# Patient Record
Sex: MALE | Race: BLACK OR AFRICAN AMERICAN
[De-identification: names, ages, dates, MRNs, and addresses within clinical notes are randomized per-mention and may not be internally consistent; named-entity substitution may affect disease eponyms.]

---

## 2018-03-27 ENCOUNTER — HOSPITAL ENCOUNTER (EMERGENCY)
Dept: HOSPITAL 68 - ERH | Age: 68
End: 2018-03-27
Payer: COMMERCIAL

## 2018-03-27 VITALS — SYSTOLIC BLOOD PRESSURE: 144 MMHG | DIASTOLIC BLOOD PRESSURE: 78 MMHG

## 2018-03-27 DIAGNOSIS — Z79.4: ICD-10-CM

## 2018-03-27 DIAGNOSIS — J18.9: Primary | ICD-10-CM

## 2018-03-27 DIAGNOSIS — I10: ICD-10-CM

## 2018-03-27 DIAGNOSIS — E11.9: ICD-10-CM

## 2018-03-27 LAB
ABSOLUTE GRANULOCYTE CT: 4.4 /CUMM (ref 1.4–6.5)
APTT BLD: 32 SEC (ref 25–37)
BASOPHILS # BLD: 0.1 /CUMM (ref 0–0.2)
BASOPHILS NFR BLD: 0.7 % (ref 0–2)
EOSINOPHIL # BLD: 0.3 /CUMM (ref 0–0.7)
EOSINOPHIL NFR BLD: 4 % (ref 0–5)
ERYTHROCYTE [DISTWIDTH] IN BLOOD BY AUTOMATED COUNT: 15.5 % (ref 11.5–14.5)
GRANULOCYTES NFR BLD: 55 % (ref 42.2–75.2)
HCT VFR BLD CALC: 37.7 % (ref 42–52)
LYMPHOCYTES # BLD: 2.6 /CUMM (ref 1.2–3.4)
MCH RBC QN AUTO: 26.6 PG (ref 27–31)
MCHC RBC AUTO-ENTMCNC: 31.5 G/DL (ref 33–37)
MCV RBC AUTO: 84.3 FL (ref 80–94)
MONOCYTES # BLD: 0.6 /CUMM (ref 0.1–0.6)
PLATELET # BLD: 236 /CUMM (ref 130–400)
PMV BLD AUTO: 9.5 FL (ref 7.4–10.4)
PROTHROMBIN TIME: 12.8 SEC (ref 9.4–12.5)
RED BLOOD CELL CT: 4.47 /CUMM (ref 4.7–6.1)
WBC # BLD AUTO: 7.9 /CUMM (ref 4.8–10.8)

## 2018-03-27 NOTE — ED GENERAL ADULT
History of Present Illness
 
General
Chief Complaint: Upper Respiratory Sx/Fever
Stated Complaint: CHEST CONGESTION, SOB
Source: patient
Exam Limitations: no limitations
Allergies
Coded Allergies:
acetaminophen (PER PT MD SAID NO APAP HX OF HEP C 18)
 
Reconcile Medications
Amlodipine Besylate 10 MG TABLET   1 TAB PO DAILY BP  (Reported)
Amoxicillin/Potassium Clav (Augmentin 875-125 Tablet) 875 MG-125 MG TABLET   1 
TAB PO BID PNEUMONIA
Benzonatate (Unknown Strength) CAPSULE   (Unknown Dose) PO AD COUGH  (Reported)
Budesonide/Formoterol Fumarate (Symbicort 160-4.5 Mcg Inhaler) 160 MCG-4.5 MCG/
ACTUATION HFA.AER.AD   2 PUF INH BID BREATHING PROBLEMS  (Reported)
Carvedilol 12.5 MG TABLET   1 TAB PO BID HEART/BP  (Reported)
Codeine Phosphate/Guaifenesi (Cheratussin AC Syrup) 10 MG-100 MG/5 ML LIQUID   5
-10 ML PO Q6 PRN COUGH
Codeine Phosphate/Guaifenesi (Cheratussin AC Syrup) 10 MG-100 MG/5 ML LIQUID   5
-10 ML PO Q6HR PRN COUGH
Diclofenac Sodium (Voltaren) (Unknown Strength) GEL..GRAM.   (Unknown Dose) TOP 
AD PRN PAIN  (Reported)
     apply to affected area(s)
Duloxetine HCl 60 MG CAPSULE.DR   1 CAP PO DAILY MENTAL HEALTH  (Reported)
Famotidine 40 MG TABLET   1 TAB PO DAILY GI  (Reported)
Gabapentin 100 MG CAPSULE   1 CAP PO QAM MENTAL HEALTH  (Reported)
Insulin Glargine,Hum.rec.anlog (Lantus Solostar) 100 UNIT/ML (3 ML) INSULN.PEN  
25 UNIT SC QHS DM  (Reported)
Losartan Potassium 100 MG TABLET   1 TAB PO DAILY BP  (Reported)
Naproxen 500 MG TABLET   1 TAB PO AD PRN PAIN  (Reported)
Pantoprazole Sodium 40 MG TABLET.DR   1 TAB PO DAILY GI  (Reported)
Prednisone 10 MG TABLET   3 TAB PO DAILY cough
Tramadol HCl 50 MG TABLET   1 TAB PO AD PRN PAIN  (Reported)
 
Triage Nurses Notes Reviewed? yes
Onset: Gradual
Duration: worse persistent since (1 WEEK)
Timing: recent history
Injury Environment: home
Severity: moderate
No Modifying Factors: none
Associated Symptoms: cough
HPI:
Patient is a 67-year-old male with history of diabetes and hypertension 
presenting to the emergency department with chief complaint of nonproductive 
cough as been going on for the past 4-5 weeks.  Patient was diagnosed with the 
flu about a month and half ago and symptoms persisted since then.  Denies any 
fevers but reports chills.  No nausea or vomiting.  Denies any chest pain or 
palpitations.  He's been using his inhaler intermittently for symptoms without 
relief.  He went back to the hospital 3 days ago and they told him "it was just 
a cough" and that he should use over-the-counter cough medication to help with 
his symptoms.  No relief with over-the-counter medications.  Patient reports 
symptoms are continuous.  Denies any lower extremities swelling.  Denies recent 
travel.  No sick contacts.
(Josephine Mensah)
 
Vital Signs & Intake/Output
Vital Signs & Intake/Output
 Vital Signs
 
 
Date Time Temp Pulse Resp B/P B/P Pulse O2 O2 Flow FiO2
 
     Mean Ox Delivery Rate 
 
 1759 97.5 62 18 144/78  97 Room Air Room Air 
 
 1640 97.8 58 20 189/98     
 
 1640 97.8 58 20 189/98     
 
 1457 97.8 58 20 189/98  98 Room Air Room Air 
 
 1310      99   
 
 
 
(Kamilla SAEZ,Edmond FAGAN)
 
Past History
 
Travel History
Traveled to [place] past 21 day No
 
Medical History
Any Pertinent Medical History? see below for history
Neurological: NONE
EENT: NONE
Cardiovascular: hypertension
Gastrointestinal: NONE
Hepatic: NONE
Renal: NONE
Musculoskeletal: NONE
Psychiatric: NONE
Endocrine: DM
Blood Disorders: NONE
 
Surgical History
Surgical History: non-contributory
 
Psychosocial History
What is your primary language English
 
Family History
Hx Contributory? No
(Josephine Mensah)
 
Review of Systems
 
Review of Systems
Constitutional:
Reports: see HPI, chills. 
Comments
Review of systems: See HPI, All other systems negative.
Constitutional, no chills fever or weight loss
HEENT: No visual changes no sore throat 
Cardiovascular: No chest pain ,palpitation , orthopnea or ankle swelling
Skin, no jaundice no rashes
Respiratory: No sputum or hemoptysis
GI: No nausea no vomiting
: No dysuria No hematuria
Muscle skeletal: no back pain, no neck pain,
Neurologic: No numbness no confusion NO HEADACHES
Psych: No stress anxiety or depression,.
Heme/endocrine: No bruising no bleeding no polyuria or polydipsia
Immunology: No splenectomy or history of AIDS
(Theron JONESJosephine)
 
Physical Exam
 
Physical Exam
General Appearance: well developed/nourished, alert, awake, mild distress
Comments:
Well-developed well-nourished person in no acute distress
HEENT:  Pupils equally round and reactive to light and accommodation. Nose is 
atraumatic. External auditory canal and Tympanic membranes clear. Pharynx 
normal. No swelling or edema. 
Neck: Supple, no lymphadenopathy
Back: Nontender
Cardiovascular: Regular rate and rhythms no murmurs rubs or gallops
Respiratory: Chest nontender. MOD respiratory distress.WHEEZING  to auscultation
bilaterally with scattered rhonchi.
Abdomen: Soft, nontender nondistended, no appreciable organomegaly. Normal bowel
sounds. No ascites
Extremity: No edema, no calf tenderness to palpation, normal and equal pulses.
Neuro: Alert oriented x3
Skin: No appreciable rash on exposed skin, skin is warm and dry.
Psych: Mood and affect is normal, memory and judgment is normal.
 
Core Measures
ACS in differential dx? No
CVA/TIA Diagnosis: No
Sepsis Present: No
Sepsis Focused Exam Completed? No
(Theron JONES,Josephine)
 
Progress
Differential Diagnoses
I considered the following diagnoses in my evaluation of the patient:  
Bronchitis, COPD exacerbation, pneumonia, influenza, ACS, pulmonary embolism
 
Diagnostic Imaging:
Viewed by Me: Radiology Read.  Discussed w/RAD: Radiology Read. 
Radiology Impression: ATIENT: KRISTEN NUNN  MEDICAL RECORD NO: [id number] PRESENT AGE
: 67  PATIENT ACCOUNT NO: [id number] : 51  LOCATION: [facility] ORDERING 
PHYSICIAN: Josephine JONES     SERVICE DATE: 124 EXAM TYPE: RAD - 
XRY-CHEST XRAY, TWO VIEWS EXAMINATION: XR CHEST CLINICAL INFORMATION: Cough for 
5 weeks. COMPARISON: Chest radiograph 2018. TECHNIQUE: 2 views of the 
chest were obtained. FINDINGS: There is patchy airspace opacity in the right mid
lung zone which is new since 2018. There is bibasilar subsegmental 
atelectasis. There is no pleural effusion or pneumothorax. The heart is top 
normal in size. There are mild degenerative changes in the thoracic spine. 
IMPRESSION: Developing airspace opacity in the right midlung zone compatible 
with pneumonia and new since 2018. DICTATED BY: Abi Johnston MD  DATE/
TIME DICTATED:18 :[facility]  DATE/TIME TRANSCRIBED:
18 CONFIDENTIAL, DO NOT COPY WITHOUT APPROPRIATE AUTHORIZATION.  <
Electronically signed in Other Vendor System>                                   
                                                    SIGNED BY: Abi Johnston MD
18 1350
Initial ED EKG: sinus rhythm at 64 bpm, ( T-wave inversion in the later)
Prior EKG: changed
Repeat EKG: unchanged
Comments:
3/27/2018 7:00:53 PM  Patient has not had chest pain entire emergency department
visit.  Patient feeling much improved after breathing treatment and cough 
medication.  Wheezing has improved on exam.  Patient does have pneumonia 
identified on chest x-ray.  Repeat EKG and troponin aren't unchanged from first 
set.  Symptoms have been constant for 5 weeks.  Patient will follow-up with 
cardiology outpatient.  Discussed with Dr. Dewitt, he agrees with plan.
(Theron JONES,Josephine)
Plan of Care:
 Orders
 
 
Procedure Date/time Status
 
TROPONIN LEVEL  1547 Complete
 
EKG  1547 Active
 
RAPID VIRAL INFLUENZA A  1241 Complete
 
Telemetry/Cardiac Monitor  1240 Active
 
TROPONIN LEVEL  1240 Complete
 
PARTIAL THROMBOPLASTIN TIME  1240 Complete
 
PROTHROMBIN TIME  1240 Complete
 
COMPREHENSIVE METABOLIC PANEL  1240 Complete
 
CBC WITHOUT DIFFERENTIAL  1240 Complete
 
EKG  1203 Active
 
 
 Current Medications
 
 
  Sig/George Start time  Last
 
Medication Dose  Stop Time Status Admin
 
Methylprednisolone 125 MG ONCE ONE  1245 CAN 
 
(Solu Medrol)    1246  
 
 
 Laboratory Tests
 
 
 
18 1744:
Troponin I 0.03
 
18 1335:
Anion Gap 14, Estimated GFR 51  L, BUN/Creatinine Ratio 11.4, Glucose 80, 
Calcium 9.2, Total Bilirubin 1.1, AST 19, ALT 11  L, Alkaline Phosphatase 55, 
Troponin I 0.03, Total Protein 8.0, Albumin 4.2, Globulin 3.8, Albumin/Globulin 
Ratio 1.1, PT 12.8  H, INR 1.17, APTT 32, CBC w Diff NO MAN DIFF REQ, RBC 4.47  
L, MCV 84.3, MCH 26.6  L, MCHC 31.5  L, RDW 15.5  H, MPV 9.5, Gran % 55.0, 
Lymphocytes % 32.5, Monocytes % 7.8, Eosinophils % 4.0, Basophils % 0.7, 
Absolute Granulocytes 4.4, Absolute Lymphocytes 2.6, Absolute Monocytes 0.6, 
Absolute Eosinophils 0.3, Absolute Basophils 0.1
 Microbiology
1641  NASOPHARYN: Influenza Virus A & B Rapid Smear - COMP
 135  BLOOD: Blood Culture - CAN
                Cancelled: Cancelled via OE: Error
1351  BLOOD: Blood Culture - CAN
                Cancelled: Cancelled via OE: Error
 
 
 
 
(Josephine Mensah)
(Kamilla SAEZ,Edmond FAGAN)
 
Departure
 
Departure
Time of Disposition: 
Disposition: HOME OR SELF CARE
Condition: Stable
Clinical Impression
Primary Impression: Pneumonia
Qualifiers:  Pneumonia type: due to unspecified organism Laterality: right Lung 
location: middle lobe of lung Qualified Code: J18.1 - Lobar pneumonia, 
unspecified organism
Referrals:
BENITO Roldan MD
 
Unknown (PCP/Family)
 
Additional Instructions:
Follow-up with your primary care physician in the next 1-2 days.  Take 
antibiotics, prednisone taper and cough medication as prescribed.  Increase 
fluids.  Return if worsening symptoms or concerns.
 
Also follow-up with cardiology as U EKG was changed from previous EKG in 
February.  Return to the emergency department if he develop any chest pain 
shortness of breath worsening symptoms or concerns.
 
 
Departure Forms:
Customer Survey
General Discharge Information
Prescriptions:
Current Visit Scripts
Amoxicillin/Potassium Clav (Augmentin 875-125 Tablet) 1 TAB PO BID  
     #20 TAB 
 
Codeine Phosphate/Guaifenesi (Cheratussin AC Syrup) 5-10 ML PO Q6HR PRN COUGH 
     #125 ML 
 
Prednisone 3 TAB PO DAILY  
     #12 TAB 
 
 
(Josephine Mensah)
 
PA/NP Co-Sign Statement
Statement:
ED Attending supervision documentation-
 
[x] I saw and evaluated the patient. I have also reviewed all the pertinent lab 
results and diagnostic results. I agree with the findings and the plan of care 
as documented in the PA's/NP's documentation.  Patient presents for evaluation 
of chest congestion and wheezing and shortness of breath.  Physical examination 
reveals adequate bilateral air entry with scattered end expiratory wheezing and 
mild rhonchi.  Lower extremities reveals no edema or tenderness.
 
[] I have reviewed the ED Record and agree with the PA's/NP's documentation.
 
[] Additions or exceptions (if any) to the PAs/NP's note and plan are 
summarized below:
[]
 
(Kamilla SAEZ,Edmond FAGAN)
 
PA/NP Co-Sign Statement
Statement:
ED Attending supervision documentation-
 
[] I saw and evaluated the patient. I have also reviewed all the pertinent lab 
results and diagnostic results. I agree with the findings and the plan of care 
as documented in the PA's/NP's documentation. 
 
[] I have reviewed the ED Record and agree with the PA's/NP's documentation.
 
[] Additions or exceptions (if any) to the PAs/NP's note and plan are 
summarized below:
[]
 
(Edmond Dewitt DO)
 
Critical Care Note
 
Critical Care Note
Critical Care Time: non-applicable
(Josephine Mensah)

## 2018-04-23 ENCOUNTER — HOSPITAL ENCOUNTER (INPATIENT)
Dept: HOSPITAL 68 - ERH | Age: 68
LOS: 4 days | Discharge: ANOTHER HOSPITAL | DRG: 291 | End: 2018-04-27
Admitting: INTERNAL MEDICINE
Payer: COMMERCIAL

## 2018-04-23 VITALS — BODY MASS INDEX: 26.51 KG/M2 | HEIGHT: 69 IN | WEIGHT: 179 LBS

## 2018-04-23 VITALS — SYSTOLIC BLOOD PRESSURE: 178 MMHG | DIASTOLIC BLOOD PRESSURE: 104 MMHG

## 2018-04-23 VITALS — SYSTOLIC BLOOD PRESSURE: 162 MMHG | DIASTOLIC BLOOD PRESSURE: 78 MMHG

## 2018-04-23 DIAGNOSIS — R00.1: ICD-10-CM

## 2018-04-23 DIAGNOSIS — I50.23: ICD-10-CM

## 2018-04-23 DIAGNOSIS — J44.9: ICD-10-CM

## 2018-04-23 DIAGNOSIS — Z88.8: ICD-10-CM

## 2018-04-23 DIAGNOSIS — I13.0: Primary | ICD-10-CM

## 2018-04-23 DIAGNOSIS — R06.89: ICD-10-CM

## 2018-04-23 DIAGNOSIS — I77.819: ICD-10-CM

## 2018-04-23 DIAGNOSIS — R09.02: ICD-10-CM

## 2018-04-23 DIAGNOSIS — D72.829: ICD-10-CM

## 2018-04-23 DIAGNOSIS — N18.2: ICD-10-CM

## 2018-04-23 DIAGNOSIS — I16.0: ICD-10-CM

## 2018-04-23 DIAGNOSIS — F32.9: ICD-10-CM

## 2018-04-23 DIAGNOSIS — E11.22: ICD-10-CM

## 2018-04-23 DIAGNOSIS — Z87.891: ICD-10-CM

## 2018-04-23 DIAGNOSIS — R05: ICD-10-CM

## 2018-04-23 DIAGNOSIS — Z79.4: ICD-10-CM

## 2018-04-23 LAB
ABSOLUTE GRANULOCYTE CT: 9.2 /CUMM (ref 1.4–6.5)
BASOPHILS # BLD: 0.1 /CUMM (ref 0–0.2)
BASOPHILS NFR BLD: 0.5 % (ref 0–2)
EOSINOPHIL # BLD: 0.2 /CUMM (ref 0–0.7)
EOSINOPHIL NFR BLD: 2.3 % (ref 0–5)
ERYTHROCYTE [DISTWIDTH] IN BLOOD BY AUTOMATED COUNT: 14.6 % (ref 11.5–14.5)
GRANULOCYTES NFR BLD: 83.7 % (ref 42.2–75.2)
HCT VFR BLD CALC: 33.3 % (ref 42–52)
LYMPHOCYTES # BLD: 1 /CUMM (ref 1.2–3.4)
MCH RBC QN AUTO: 27 PG (ref 27–31)
MCHC RBC AUTO-ENTMCNC: 32.4 G/DL (ref 33–37)
MCV RBC AUTO: 83.3 FL (ref 80–94)
MONOCYTES # BLD: 0.5 /CUMM (ref 0.1–0.6)
PLATELET # BLD: 226 /CUMM (ref 130–400)
PMV BLD AUTO: 9.9 FL (ref 7.4–10.4)
RED BLOOD CELL CT: 3.99 /CUMM (ref 4.7–6.1)
WBC # BLD AUTO: 10.9 /CUMM (ref 4.8–10.8)

## 2018-04-23 NOTE — HISTORY & PHYSICAL
General Information and HPI
MD Statement:
I have seen and personally examined KRISTEN AGUIRRE and documented this H&P.
 
The patient is a 67 year old M who presented with a patient stated chief 
complaint of [].
 
Source of Information: patient, family, old records
Exam Limitations: no limitations, unable to give history
History of Present Illness:
Mr. Aguirre is a 67-year-old gentleman with past medical history of hypertension, 
CHF, acute kidney injury, diabetes who presented to the emergency department 
complaining of dyspnea and dyspnea on exertion.  He also endorses a cough which 
he has unable to get relief from despite multiple nebulizer treatments at home.
He was seen at the [facility] emergency department on 03/27/2018 when he was 
prescribed Augmentin, prednisone and cough syrup and then again on 03/29/2018 
complaining of recurrent coughing, this time we was sent home on [employer]
and referral given a referral to follow up with pulmonology.
Mr Aguirre reports that over the last few weeks his symptoms have got worse.  
Yesterday he attempted to take multiple breathing treatments and stated that 
these provided no relief.  States that he has been unable to continue with 
activities of daily living.  He endorsed a mild pain in his chest and abdomen 
which he attributed to recurrent coughing.  Mr Aguirre also denied any fever, 
chills, nausea, vomiting.  He denies being exposed to any sick contacts or 
recent travel, acute injury or prolonged immobility.  
Reports good medication compliance.
He has recently moved to this area from [place].  
He normally follows up with pulmonologist and cardiologist in [facility].
Pulmonologist: 
Dr. Sundeep Gaffney. [address] [place] [address]. [phone]
Cardiologist:
Dr Salinas. 
 
 
 
 
 
Allergies/Medications
Allergies:
Coded Allergies:
acetaminophen (PER PT MD SAID NO APAP HX OF HEP C 02/16/18)
 
Home Med list
Amlodipine Besylate 10 MG TABLET   1 TAB PO DAILY BP  (Reported)
Carvedilol 12.5 MG TABLET   1 TAB PO BID HEART/BP  (Reported)
Duloxetine HCl 60 MG CAPSULE.   1 CAP PO DAILY MENTAL HEALTH  (Reported)
Famotidine 40 MG TABLET   1 TAB PO DAILY GI  (Reported)
Insulin Aspart (Novolog) (Unknown Strength) VIAL   (Unknown Dose) SC SEE SLIDING
SCALE DIABETES  (Reported)
Insulin Glargine,Hum.rec.anlog (Lantus Solostar) 100 UNIT/ML (3 ML) INSULN.PEN  
25 UNIT SC QHS DM  (Reported)
Losartan Potassium 100 MG TABLET   1 TAB PO DAILY BP  (Reported)
Prednisone 10 MG TABLET   3 TAB PO DAILY cough
 
Compliance With Home Meds: UNKNOWN
 
Past History
 
Travel History
Traveled to [place] past 21 day No
 
Medical History
Neurological: NONE
EENT: NONE
Cardiovascular: CHF, hypertension
Respiratory: asthma, COPD, pneumonia
Gastrointestinal: NONE
Hepatic: NONE
Renal: NONE
Musculoskeletal: NONE
Psychiatric: NONE
Endocrine: DM
Blood Disorders: NONE
 
Surgical History
Surgical History: non-contributory
 
Past Family/Social History
 
Psychosocial History
Where do you live? Home
Who Do You Live With? parent
Primary Language: English
Smoking Status: Former Smoker (Approximately 30 years ago)
ETOH Use: denies use
Illicit Drug Use: cocaine, Stopped Using 30 years ago 
Living Will? no
 
Functional Ability
ADLs
Independent: dressing, eating, toileting, bathing. 
Ambulation: independent
IADLs
Independent: shopping, housework, finances, food prep, telephone, transportation
, medication admin. 
 
Review of Systems
 
Review of Systems
Constitutional:
Reports: see HPI. 
 
Exam & Diagnostic Data
Last 24 Hrs of Vital Signs/I&O
 Vital Signs
 
 
Date Time Temp Pulse Resp B/P B/P Pulse O2 O2 Flow FiO2
 
     Mean Ox Delivery Rate 
 
04/23 1458 99.1 82 34 174/101     
 
04/23 1457 99.1 82 34 174/101     
 
04/23 1457 99.1 82 34 174/101     
 
04/23 1454    174/101     
 
04/23 1246 99.1 82 34 170/87  96 Room Air  
 
04/23 1122      94   
 
04/23 1001      96   
 
04/23 0946 97.9 93 40 200/110  96 Room Air  
 
 
 Intake & Output
 
 
 04/23 1600 04/23 0800 04/23 0000
 
Intake Total 0  
 
Output Total   
 
Balance 0  
 
    
 
Intake, Oral 0  
 
 
 
 
Physical Exam
General Appearance Alert, Oriented X3, Mild Distress
Skin No Rashes
Skin Temp/Moisture Exam: Warm/Dry
HEENT PERRLA, Mucous Membr. moist/pink, JVD +
Cardiovascular Normal S1, Normal S2
Lungs Bilateral Crackles noted at the lower lung bases. Expiratory rhonchi and 
wheezing noted. 
Abdomen Normal Bowel Sounds, Soft, No Tenderness
Neurological Normal Gait, Normal Speech, Strength at 5/5 X4 Ext, Normal Tone, 
Cranial Nerves 3-12 NL
Extremities Trace 1+ edema 
Vascular Normal Pulses
Last 24 Hrs of Labs/Randy:
 Laboratory Tests
 
04/23/18 1230:
Anion Gap 12, Estimated GFR 51  L, BUN/Creatinine Ratio 16.4, Glucose 125  H, 
Calcium 9.0, Total Bilirubin 1.1, AST 24, ALT 23, Alkaline Phosphatase 46, 
Troponin I 0.10, Pro-B-Natriuretic Pept 7500  H, Total Protein 7.4, Albumin 3.9,
Globulin 3.5, Albumin/Globulin Ratio 1.1, CBC w Diff MAN DIFF ORDERED, RBC 3.99 
L, MCV 83.3, MCH 27.0, MCHC 32.4  L, RDW 14.6  H, MPV 9.9, Gran % 83.7  H, 
Lymphocytes % 8.9  L, Monocytes % 4.6, Eosinophils % 2.3, Basophils % 0.5, 
Absolute Granulocytes 9.2  H, Absolute Lymphocytes 1.0  L, Absolute Monocytes 
0.5, Absolute Eosinophils 0.2, Absolute Basophils 0.1, Platelet Estimate 
VERIFIED BY SMEAR, Anisocytosis 1+
 Microbiology
04/23 1459  URINE ROUT: Legionella Antigen - ORD
04/23 1459  URINE ROUT: Streptococcus pneumoniae Antigen (M - ORD
04/23 1455  LOWER RESP: Respiratory Culture - ORD
04/23 1455  LOWER RESP: Gram Stain - ORD
04/23 1230  BLOOD: Blood Culture - RECD
04/23 1139  NASOPHARYN: Influenza Virus A & B Rapid Smear - COMP
04/23 1001  BLOOD: Blood Culture - ORD
 
 
 
Diagnostic Data
CXR Results
SERVICE DATE: 04/23/
EXAM TYPE: RAD - XRY-CHEST XRAY, TWO VIEWS
 
EXAMINATION:
XR CHEST
 
CLINICAL INFORMATION:
Cough and shortness of breath. Evaluate for pneumonia and congestive heart
failure.
 
COMPARISON:
CXR from 03/27/2018 and 03/29/2018
 
TECHNIQUE:
2 views of the chest were obtained.
 
FINDINGS:
Cardiac silhouette is borderline enlarged. Interval redevelopment of
peribronchial interstitial thickening and interlobular septal thickening.
This has a relatively symmetric appearance in the mid to-lower lung zones,
suggestive of cardiogenic pulmonary edema. Linear opacity of subsegmental
atelectasis in the medial left lower lobe. Linear opacity of scarring or
atelectasis in the right lung apex. A trace right pleural effusion is
present. Atherosclerotic calcification of aortic arch. The visualized bones
are intact.
 
IMPRESSION:
Findings of cardiogenic pulmonary edema are similar to 03/27/2018.
 
Assessment/Plan
Assessment:
Mr. Aguirre is a 67-year-old gentleman with past medical history of hypertension, 
CHF, acute kidney injury, diabetes who presented to the emergency department 
complaining of dyspnea and dyspnea on exertion.  He also endorses a cough which 
he has unable to get relief from despite multiple nebulizer treatments at home.
 
In the emergency department he received 25 mg of Solu-Medrol, azithromycin, 
ceftriaxone, IV Lasix 60 mg.
 
Symptoms are likely related to worsening CHF. 
 
We will admit him to the telemetry service and manage for the following 
problems.
 
#Acute dyspnea and dyspnea on minimal exertion secondary to acutre congestive 
heart failure
#Hypertensive urgency
#Abdominal and chest pain
#Rule out ACS
#Chronic Renal Insuficiency
#History of diabetes
#History of depression
 
Admit patient to telemetry overnight.
Continue IV Lasix from 4/24/2018,  40 mg twice a day.
Monitor ins and outs and daily weights.
Watch off antibiotics for now if patient comes femoral may consider empiric 
coverage with Azithromycin and ceftriaxone. 
Monitor patient closely and if continues to remain tachycardic, hypoxic and 
dyspneic may consider a CTA to rule out a PE.
BEP daily to detect renal function and electrolytes.
Continue home medications of amlodipine, losartan, carvedilol.
Serial troponins and EKG.  Rule out ACS.
Obtain formal cardiology consultation in a.m.
3 times a day at bedtime fingersticks with NovoLog sliding scale.
Consider addition of Levemir if sugars remain uncontrolled.
Tessalon Perles for symptomatic relief.
Diet heart healthy with sodium restriction.
Obtain records from cardiologist.
Prophylaxis with heparin subcutaneous.
Patient is a full code.
 
As Ranked By This Provider
Problem List:
 1. CHF (congestive heart failure)
 
 
Core Measures/Misc (9/17)
 
Acute Coronary Syndrome
ACS Diagnosis: No
 
Congestive Heart Failure
Congestive Heart Failure Diagnosis Yes
 
Cerebrovascular Accident
CVA/TIA Diagnosis: No
 
VTE (View Protocol)
VTE Risk Factors Age>40
No Mechanical VTE Prophylaxis d/t N/A MechProphylax Ordered
No VTE Pharm Prophylaxis d/t NA PharmProphylax ordered
 
Sepsis (View protocol)
Sepsis Present: No

## 2018-04-23 NOTE — PN- ATT ADDEND
Attending Addendum
Attending Brief Note
Patient seen and examined in emergency room.  Plan of care discussed with the 
medical team and the patient.  Available lab work and radiology test reports 
were reviewed.  This is 67-year-old -American male with past history of 
COPD CHF hypertension who had been having the several weeks of for difficulty 
breathing.  As the last week his physician gave him prednisone trial.  For the 
past the day to his bleeding has currently worse despite prednisone and became 
severely short of breath today.  Patient denies any recent fever chills any 
chest pain any nausea vomiting or abdominal pain.  It is  not clear whether 
patient has gained any weight.  Patient is noted to be frequently coughing.
 
Exam :
General: Patient awake alert oriented without any distress; On exam he appears 
tachypneic and  appears to be in respiratory distress.
CVS: S1 plus S2 without any murmur or gallops;  JVD is elevated
Chest: Somewhat decreased air entry at the bases; bilateral scattered 
crepitation with mild to moderate bilateral wheeze.  There is  respiratory 
distress and patient is having trouble completing sentences.  
Abdomen: Soft non-tender, bowel sound present, no guarding or rebound 
CNS: Awake alert oriented without any focal neuro deficit and follows commands  
appropriately 
Extremities: Trace  edema; no clubbing or cyanosis noted 
 
Assessment
* Acute and chronic history of heart failure with evidence of pulmonary edema on
chest x-ray; patient quit smoking 25 years ago we should decrease the 
possibility of COPD
* Hist of  hypertension
* History of pneumonia and acute bronchitis recently treated with prednisone
* History of diabetes
* Hypertensive urgency with BP of 200/110
* Chronic renal failure stage III a
Plan
* Given Lasix initially 60 mg IV 1 and then 40 mg IV twice a day
* Admit to telemetry
* Rule out MI
* Currently consult
* Obtain old records
* Patient likely will need tachycardic gram if none has been done recently
* Watch for fever, if patient becomes febrile begin empiric antibiotic treatment
* Continue other home medications
* Monitor input output and weights
* Low-sodium diet
* DVT  prophylaxis
* Continue antihypertensive regimen
 
 Current Medications
 
 
  Sig/George Start time  Last
 
Medication Dose Route Stop Time Status Admin
 
Albuterol Sulfate 3 ML ONCE ONE 04/23 1000 DC 04/23
 
  INH 04/23 1001  1001
 
Amlodipine Besylate 10 MG DAILY 04/23 1454 UNVr 
 
  PO   
 
Amlodipine Besylate 10 MG ONCE ONE 04/23 1445 DC 04/23
 
  PO 04/23 1446  1457
 
Azithromycin 500 MG ONCE ONE 04/23 1415 AC 04/23
 
Sodium Chloride 250 ML IV 04/23 1514  1415
 
Benzonatate 100 MG TID 04/23 1500 UNVr 
 
  PO   
 
Carvedilol 12.5 MG BID 04/23 2100 UNVr 
 
  PO   
 
Carvedilol 12.5 MG ONCE ONE 04/23 1445 DC 
 
  PO 04/23 1446  
 
Ceftriaxone Sodium 0 .STK-MED ONE 04/23 1416 DC 
 
  .ROUTE   
 
Ceftriaxone Sodium 1,000 MG ONCE ONE 04/23 1415 DC 04/23
 
  IV 04/23 1416  1415
 
Duloxetine HCl 60 MG DAILY 04/23 1454 UNVr 
 
  PO   
 
Famotidine 40 MG DAILY 04/23 1454 UNVr 
 
  PO   
 
Furosemide 0 .STK-MED ONE 04/23 1436 DC 
 
  IV   
 
Furosemide 60 MG ONCE ONE 04/23 1430 DC 04/23
 
  IV 04/23 1431  1437
 
Guaifenesin/Codeine  10 ML ONCE ONE 04/23 1015 DC 04/23
 
Phosphate  PO 04/23 1016  1118
 
Heparin Sodium  5,000 UNIT Q8 04/23 1457 UNVr 
 
(Porcine)  SC   
 
Ipratropium Bromide 2.5 ML ONCE ONE 04/23 1000 DC 04/23
 
  INH 04/23 1001  1000
 
Losartan Potassium 100 MG DAILY 04/23 1455 UNVr 
 
  PO   
 
Losartan Potassium 100 MG ONCE ONE 04/23 1445 DC 04/23
 
  PO 04/23 1446  1457
 
Methylprednisolone 40 MG Q8 04/23 1457 UNVr 
 
  IV   
 
Methylprednisolone 125 MG ONCE ONE 04/23 1015 DC 04/23
 
  IV 04/23 1016  1118
 
Methylprednisolone 0 .STK-MED ONE 04/23 1007 DC 
 
  .ROUTE   
 
 
Laboratory Tests
 
 
 
04/23/18 1230:
Anion Gap 12, Estimated GFR 51  L, BUN/Creatinine Ratio 16.4, Glucose 125  H, 
Calcium 9.0, Total Bilirubin 1.1, AST 24, ALT 23, Alkaline Phosphatase 46, 
Troponin I 0.10, Pro-B-Natriuretic Pept 7500  H, Total Protein 7.4, Albumin 3.9,
Globulin 3.5, Albumin/Globulin Ratio 1.1, CBC w Diff MAN DIFF ORDERED, RBC 3.99 
L, MCV 83.3, MCH 27.0, MCHC 32.4  L, RDW 14.6  H, MPV 9.9, Gran % 83.7  H, 
Lymphocytes % 8.9  L, Monocytes % 4.6, Eosinophils % 2.3, Basophils % 0.5, 
Absolute Granulocytes 9.2  H, Absolute Lymphocytes 1.0  L, Absolute Monocytes 
0.5, Absolute Eosinophils 0.2, Absolute Basophils 0.1, Platelet Estimate 
VERIFIED BY SMEAR, Anisocytosis 1+
 Microbiology
04/23 1459  URINE ROUT: Legionella Antigen - ORD
04/23 1459  URINE ROUT: Streptococcus pneumoniae Antigen (M - ORD
04/23 1455  LOWER RESP: Respiratory Culture - ORD
04/23 1455  LOWER RESP: Gram Stain - ORD
04/23 1230  BLOOD: Blood Culture - RECD
04/23 1139  NASOPHARYN: Influenza Virus A & B Rapid Smear - COMP
04/23 1001  BLOOD: Blood Culture - ORD
 
 Vital Signs
 
 
Date Time Temp Pulse Resp B/P B/P Pulse O2 O2 Flow FiO2
 
     Mean Ox Delivery Rate 
 
04/23 1458 99.1 82 34 174/101     
 
04/23 1457 99.1 82 34 174/101     
 
04/23 1457 99.1 82 34 174/101     
 
04/23 1454    174/101     
 
04/23 1246 99.1 82 34 170/87  96 Room Air  
 
04/23 1122      94   
 
04/23 1001      96   
 
04/23 0946 97.9 93 40 200/110  96 Room Air  
 
 
 Intake & Output
 
 
 04/23 1600 04/23 0800 04/23 0000
 
Intake Total 0  
 
Output Total   
 
Balance 0  
 
    
 
Intake, Oral 0  
 
 
CXR
Findings of cardiogenic pulmonary edema are similar to 03/27/2018.

## 2018-04-23 NOTE — ADMISSION CERTIFICATION
Admission Certification
 
Certification Statement
- As attending physician, I certify that at the time of
- admission, based on clinical presentation, severity of
- symptoms, need for further diagnostic testing and
- therapeutic interventions, and risk of adverse outcomes
- without in-hospital treatment, in my clinical assessment,
- this patient requires an acute hospital stay for a minimum
- of two nights or longer. I have also considered psychsocial
- factors such as support system, advanced age, financial
- issues, cognitive issues, and failed out-patient treatments,
- past re-admission history, safety of patient, and lack of
- compliance as applicable.
Specific rationale supporting this admission is:
Christopher heart failure and possible acute bronchitis

## 2018-04-23 NOTE — RADIOLOGY REPORT
EXAMINATION:
XR CHEST
 
CLINICAL INFORMATION:
Cough and shortness of breath. Evaluate for pneumonia and congestive heart
failure.
 
COMPARISON:
CXR from 03/27/2018 and 03/29/2018
 
TECHNIQUE:
2 views of the chest were obtained.
 
FINDINGS:
Cardiac silhouette is borderline enlarged. Interval redevelopment of
peribronchial interstitial thickening and interlobular septal thickening.
This has a relatively symmetric appearance in the mid to-lower lung zones,
suggestive of cardiogenic pulmonary edema. Linear opacity of subsegmental
atelectasis in the medial left lower lobe. Linear opacity of scarring or
atelectasis in the right lung apex. A trace right pleural effusion is
present. Atherosclerotic calcification of aortic arch. The visualized bones
are intact.
 
IMPRESSION:
Findings of cardiogenic pulmonary edema are similar to 03/27/2018.

## 2018-04-23 NOTE — ED DYSPNEA/ASTHMA COMPLAINT
History of Present Illness
 
General
Chief Complaint: Dyspnea (COPD, CHF, Other)
Stated Complaint: SOB
Source: patient, old records
Exam Limitations: no limitations
 
Vital Signs & Intake/Output
Vital Signs & Intake/Output
 Vital Signs
 
 
Date Time Temp Pulse Resp B/P B/P Pulse O2 O2 Flow FiO2
 
     Mean Ox Delivery Rate 
 
2230 97.5 90 20 162/78  96 Room Air  
 
 2202       Nasal 4.0L 
 
       Cannula  
 
 2122  90  162/78     
 
 1648 98.1 95 20 178/104  95 Room Air  
 
 1607  95  160/88     
 
 1543  95 22 160/88  98 Room Air  
 
 1458 99.1 82 34 174/101     
 
 1457 99.1 82 34 174/101     
 
 1457 99.1 82 34 174/101     
 
 1454    174/101     
 
 1246 99.1 82 34 170/87  96 Room Air  
 
 1122      94   
 
 1001      96   
 
 0946 97.9 93 40 200/110  96 Room Air  
 
 
 ED Intake and Output
 
 
  0000  1200
 
Intake Total 120 0
 
Output Total  
 
Balance 120 0
 
   
 
Intake, Oral 120 0
 
Patient 180 lb 
 
Weight  
 
Weight Reported by Patient 
 
Measurement  
 
Method  
 
 
 
Allergies
Coded Allergies:
acetaminophen (PER PT MD SAID NO APAP HX OF HEP C 18)
 
Reconcile Medications
Amlodipine Besylate 10 MG TABLET   1 TAB PO DAILY BP  (Reported)
Carvedilol 12.5 MG TABLET   1 TAB PO BID HEART/BP  (Reported)
Duloxetine HCl 60 MG CAPSULE.DR   1 CAP PO DAILY MENTAL HEALTH  (Reported)
Famotidine 40 MG TABLET   1 TAB PO DAILY GI  (Reported)
Insulin Aspart (Novolog) (Unknown Strength) VIAL   (Unknown Dose) SC SEE SLIDING
SCALE DIABETES  (Reported)
Insulin Glargine,Hum.rec.anlog (Lantus Solostar) 100 UNIT/ML (3 ML) INSULN.PEN  
25 UNIT SC QHS DM  (Reported)
Losartan Potassium 100 MG TABLET   1 TAB PO DAILY BP  (Reported)
Prednisone 10 MG TABLET   3 TAB PO DAILY cough
 
Triage Note:
PT TO ED C/O SOB FOR "MONTHS." STATES THAT LAST
NIGHT THE SOB GOT WORSE. PT NOTED WITH AUDIBLE
WHEEZE AND COUGH. PT HAS HX OF ASTHMA AND COPD.
USED NEBULIZER AT HOME WITH NO RELIEF. KIMBERLY AT 96%
AT THIS TIME. PT UNABLE TO SPEAK IN COMPLETE
SENTENCES. REST RATE 40.
Triage Nurses Notes Reviewed? yes
Onset: Gradual
Duration: week(s): (1), constant
Timing: recent history
Severity: severe
Prior Episodes/Possible Cause: chronic episodes
Associated Symptoms: cough
HPI:
67-year-old male with history of COPD CHF hypertension presents to the ER for 
evaluation bleeding progressing worsening shortness of breath or active cough 
clear sputum going on for the past week.  He is scheduled to see his 
cardiologist today however states he couldn't catch his breath and came to the 
emergency room.  Denies any leg swelling or chest pain no hemoptysis.  He is not
on home O2.  The patient has been by mouth prednisone for the past week however 
symptoms are getting worse.  Has been using his nebulizer treatment without 
improvement.  No fever no chills.  He states this feels similar to when he was 
here last month for pneumonia
 
 
(Torrey Traore)
 
Past History
 
Travel History
Traveled to [place] past 21 day No
 
Medical History
Any Pertinent Medical History? see below for history
Neurological: NONE
EENT: NONE
Cardiovascular: CHF, hypertension
Respiratory: asthma, COPD, pneumonia
Gastrointestinal: NONE
Hepatic: NONE
Renal: NONE
Musculoskeletal: NONE
Psychiatric: NONE
Endocrine: DM
Blood Disorders: NONE
 
Surgical History
Surgical History: non-contributory
 
Psychosocial History
What is your primary language English
Tobacco Use: Never used
 
Family History
Hx Contributory? No
(Torrey Traore)
 
Review of Systems
 
Review of Systems
Constitutional:
Reports: see HPI. 
Comments
Review of systems: See HPI, All other systems negative.
Constitutional,  fever
HEENT:  no sore throat no congestion
Cardiovascular: No chest pain , no palpitation
Skin:  no rashes, no change in skin
Respiratory:  dyspnea cough
GI: No nausea no vomiting, 
: No dysuria 
Muscle skeletal: No joint pain, no back pain, no neck pain,
Neurologic: , no headache
Heme/endocrine: No bruising 
(Torrey Traore)
 
Physical Exam
 
Physical Exam
General Appearance: well developed/nourished, alert, awake
Respiratory: decreased breath sounds
Comments:
Well-developed well-nourished person in no acute distress
HEENT: Normal EENT exam; PERRL, EOMI,HEAD is atraumatic. moist mucous membranes.
 
Neck: Supple, normal range of motion 
Back: Full range of motion
Cardiovascular: Regular rate and rhythms no murmurs rubs or gallops, normal JVP
Respiratory: Mild to moderate respiratory distress.  Patient speaking in 3-4 
word sentences lung sounds diminished bilaterally minimal wheezing,
Abdomen: Soft, nontender nondistended Normal bowel sounds. No rebound/guarding, 
No ascites.
Extremity: No edema, full range of motion of extremities
Neuro: Alert oriented x3, motor sensory normal,  There were no obvious focal 
neurologic abnormalities.
Skin: No appreciable rash on exposed skin, skin is warm and dry.
Psych: Mood and affect is normal, memory and judgment is normal.
 
Core Measures
ACS in differential dx? Yes
CVA/TIA Diagnosis No
Sepsis Present: No
Sepsis Focused Exam Completed? No
(Lauren JONES,Torrey)
 
Progress
Differential Diagnosis: asthma, AMI, CHF, COPD, pericarditis, pulmonary embolism
, pneumonia, unstable angina
Plan of Care:
 Orders
 
 
Procedure Date/time Status
 
CBC WITHOUT DIFFERENTIAL  0600 Active
 
BASIC ELECTROLYTES PLUS BUN&CR  0600 Active
 
TROPONIN LEVEL  0030 Complete
 
EKG  0030 Active
 
Consistent Carbohydrate 1  D Active
 
RT: Evaluation  2202 Active
 
Weight  1848 Active
 
Vital Signs  1848 Active
 
Teach/Educate  1848 Active
 
Pain Treatment and Response  1848 Active
 
Nutritional Intake, Monitor  1848 Active
 
Isolation  1848 Active
 
Intake & Output  1848 Active
 
Patient Care Conference  1848 Active
 
Activity/Ambulation  1848 Active
 
TROPONIN LEVEL  1830 Complete
 
EKG  1830 Active
 
Vital Signs  1647 Active
 
STREP PNEUMO URINARY ANTIGEN  1459 Active
 
LEGIONELLA URINARY ANTIGEN  1459 Active
 
Pathway - chart  1458 Active
 
House Staff  1458 Active
 
Patient Data  1458 Active
 
Code Status  1458 Active
 
LOWER RESPIRATORY CULTURE  1455 Active
 
TRC EVALUATION (GEN)  1451 Complete
 
Patient Data  1417 Active
 
Admit to inpatient  1408 Active
 
Add-on Test (ER Only)  1328 Active
 
B-TYPE NATRIURETIC PEP (BNP)  1230 Complete
 
Intake & Output  1122 Active
 
RAPID VIRAL INFLUENZA A  1001 Complete
 
BLOOD CULTURE  1001 Active
 
TROPONIN LEVEL  1001 Complete
 
COMPREHENSIVE METABOLIC PANEL  1001 Complete
 
CBC WITHOUT DIFFERENTIAL  1001 Complete
 
EKG  0942 Active
 
THERAPIST ORDERS   UNK Complete
 
OXYGEN SETUP (GEN)   UNK Complete
 
Weight   UNK Active
 
VTE Mechanical Prophylaxis   UNK Active
 
Telemetry/Cardiac Monitor   UNK Active
 
FingerStick- Glucose   UNK Active
 
 
 Current Medications
 
 
  Sig/George Start time  Last
 
Medication Dose  Stop Time Status Admin
 
Albuterol Sulfate 3 ML TID  0900 AC 
 
(Proventil)     
 
Guaifenesin/ 10 ML Q4P PRN 5 AC 
 
Dextromethorphan     
 
(Robitussin Dm)     
 
Carvedilol 12.5 MG BID  2100 AC 
 
(Coreg)     
 
Insulin Aspart 0 AT BEDTIME  2100 AC 
 
(NovoLOG)     
 
Insulin Aspart 0 TIDAC  1700 AC 
 
(NovoLOG)     
 
Benzonatate 100 MG TID  1500 AC 
 
(Tessalon Capsule)     2122
 
Heparin Sodium  5,000 UNIT Q8H  1457 AC 
 
(Porcine)     0635
 
Losartan Potassium 100 MG DAILY  1455 AC 
 
(Cozaar)     
 
Amlodipine Besylate 10 MG DAILY  1454 AC 
 
(Norvasc)     
 
Duloxetine HCl 60 MG DAILY  1454 AC 
 
(Cymbalta)     1606
 
Famotidine 40 MG DAILY  1454 AC 
 
(Pepcid)     1606
 
 
 Laboratory Tests
 
 
 
18 0107:
Troponin I 0.09
 
18 2045:
Troponin I 0.07
 
18 1230:
Anion Gap 12, Estimated GFR 51  L, BUN/Creatinine Ratio 16.4, Glucose 125  H, 
Calcium 9.0, Total Bilirubin 1.1, AST 24, ALT 23, Alkaline Phosphatase 46, 
Troponin I 0.10, Pro-B-Natriuretic Pept 7500  H, Total Protein 7.4, Albumin 3.9,
Globulin 3.5, Albumin/Globulin Ratio 1.1, CBC w Diff MAN DIFF ORDERED, RBC 3.99 
L, MCV 83.3, MCH 27.0, MCHC 32.4  L, RDW 14.6  H, MPV 9.9, Gran % 83.7  H, 
Lymphocytes % 8.9  L, Monocytes % 4.6, Eosinophils % 2.3, Basophils % 0.5, 
Absolute Granulocytes 9.2  H, Absolute Lymphocytes 1.0  L, Absolute Monocytes 
0.5, Absolute Eosinophils 0.2, Absolute Basophils 0.1, Platelet Estimate 
VERIFIED BY SMEAR, Anisocytosis 1+
 Microbiology
 145  URINE ROUT: Legionella Antigen - COLB
 1459  URINE ROUT: Streptococcus pneumoniae Antigen (M - COLB
 1455  LOWER RESP: Respiratory Culture - COLB
 1455  LOWER RESP: Gram Stain - COLB
 1230  BLOOD: Blood Culture - RECD
 1139  NASOPHARYN: Influenza Virus A & B Rapid Smear - COMP
 1001  BLOOD: Blood Culture - CAN
                Cancelled: SPECIMEN NEVER RECEIVED. REORDER IF NEEDED
 
Patient medicated breathing treatment Solu-Medrol Robitussin-AC
 
Repeat evaluation coughing is improved on ambulation to the bathroom patient 
notably became dyspneic to, oxygen saturations dropped from 9697% down to 90%.  
He is declining further breathing treatment.  Case discussed with Dr. Kohli 
agrees with plan
 
 
Case discussed with Dr. winters will admit
Diagnostic Imaging:
Viewed by Me: Radiology Read.  Discussed w/RAD: Radiology Read. 
Radiology Impression: PATIENT: KRISTEN NUNN  MEDICAL RECORD NO: [id number] PRESENT 
AGE: 67  PATIENT ACCOUNT NO: [id number] : 51  LOCATION: [facility] ORDERING 
PHYSICIAN: Torrey JONES     SERVICE DATE:  EXAM TYPE: RAD - XRY-
CHEST XRAY, TWO VIEWS EXAMINATION: XR CHEST CLINICAL INFORMATION: Cough and 
shortness of breath. Evaluate for pneumonia and congestive heart failure. 
COMPARISON: CXR from 2018 and 2018 TECHNIQUE: 2 views of the chest 
were obtained. FINDINGS: Cardiac silhouette is borderline enlarged. Interval 
redevelopment of peribronchial interstitial thickening and interlobular septal 
thickening. This has a relatively symmetric appearance in the mid to-lower lung 
zones, suggestive of cardiogenic pulmonary edema. Linear opacity of subsegmental
atelectasis in the medial left lower lobe. Linear opacity of scarring or 
atelectasis in the right lung apex. A trace right pleural effusion is present. 
Atherosclerotic calcification of aortic arch. The visualized bones are intact. 
IMPRESSION: Findings of cardiogenic pulmonary edema are similar to 2018. 
DICTATED BY: Chuck Nunez MD  DATE/TIME DICTATED:18 
:JONATHAN  DATE/TIME TRANSCRIBED:18 CONFIDENTIAL, 
DO NOT COPY WITHOUT APPROPRIATE AUTHORIZATION.  <Electronically signed in Other 
Vendor System>                                                                  
                     SIGNED BY: Chuck Nunez MD 18 1125
Initial ED EKG: NSR AT 80, NO ACUTE ST SEG CHANGES, T WAVE INVERSIONS V4-V6 
UNCHANGED
Prior EKG: unchanged (3/18)
Rhythm Strip: normal sinus rhythm
(Torrey Traore)
 
Departure
 
Departure
Time of Disposition: 1400
Disposition: STILL A PATIENT
Condition: Stable
Clinical Impression
Primary Impression: COPD exacerbation
Secondary Impressions: CHF (congestive heart failure)
Referrals:
Unknown (PCP/Family)
 
Departure Forms:
Customer Survey
General Discharge Information
 
Admission Note
Spoke With:
Rosmery Winters MD
Documentation of Exam:
Documentation of any treatments & extenuating circumstances including Concerns 
Regarding Discharge (functional status, medication knowledge or non-compliance, 
living conditions, etc.) that warrant an admission rather than observation: 
Cardiology consult pulmonology consult IV steroids and IV diuretics premature 
discharge medically harmful
 
(Torrey Traore)
 
PA/NP Co-Sign Statement
Statement:
ED Attending supervision documentation-
 
[x] I saw and evaluated the patient. I have also reviewed all the pertinent lab 
results and diagnostic results. I agree with the findings and the plan of care 
as documented in the PA's/NP's documentation.  Patient presents for evaluation 
of worsening dyspnea.  Physical examination reveals diminished breath sounds 
bilaterally. 
 
[] I have reviewed the ED Record and agree with the PA's/NP's documentation.
 
[] Additions or exceptions (if any) to the PAs/NP's note and plan are 
summarized below:
[]
 
(Kamilla SAEZ,Edmond FAGAN)
 
Critical Care Note
 
Critical Care Note
Critical Care Time: non-applicable
(Torrey Traore)

## 2018-04-24 VITALS — SYSTOLIC BLOOD PRESSURE: 140 MMHG | DIASTOLIC BLOOD PRESSURE: 70 MMHG

## 2018-04-24 VITALS — SYSTOLIC BLOOD PRESSURE: 150 MMHG | DIASTOLIC BLOOD PRESSURE: 56 MMHG

## 2018-04-24 VITALS — SYSTOLIC BLOOD PRESSURE: 150 MMHG | DIASTOLIC BLOOD PRESSURE: 80 MMHG

## 2018-04-24 VITALS — DIASTOLIC BLOOD PRESSURE: 70 MMHG | SYSTOLIC BLOOD PRESSURE: 140 MMHG

## 2018-04-24 VITALS — SYSTOLIC BLOOD PRESSURE: 112 MMHG | DIASTOLIC BLOOD PRESSURE: 60 MMHG

## 2018-04-24 LAB
ABSOLUTE GRANULOCYTE CT: 10.1 /CUMM (ref 1.4–6.5)
BASOPHILS # BLD: 0 /CUMM (ref 0–0.2)
BASOPHILS NFR BLD: 0.2 % (ref 0–2)
EOSINOPHIL # BLD: 0 /CUMM (ref 0–0.7)
EOSINOPHIL NFR BLD: 0.1 % (ref 0–5)
ERYTHROCYTE [DISTWIDTH] IN BLOOD BY AUTOMATED COUNT: 14.4 % (ref 11.5–14.5)
GRANULOCYTES NFR BLD: 84.9 % (ref 42.2–75.2)
HCT VFR BLD CALC: 36.3 % (ref 42–52)
LYMPHOCYTES # BLD: 1.1 /CUMM (ref 1.2–3.4)
MCH RBC QN AUTO: 26.9 PG (ref 27–31)
MCHC RBC AUTO-ENTMCNC: 32.2 G/DL (ref 33–37)
MCV RBC AUTO: 83.5 FL (ref 80–94)
MONOCYTES # BLD: 0.6 /CUMM (ref 0.1–0.6)
PLATELET # BLD: 240 /CUMM (ref 130–400)
PMV BLD AUTO: 10.6 FL (ref 7.4–10.4)
RED BLOOD CELL CT: 4.34 /CUMM (ref 4.7–6.1)
WBC # BLD AUTO: 11.9 /CUMM (ref 4.8–10.8)

## 2018-04-24 NOTE — PN- HOUSESTAFF
Kevin SAEZ,Briana 18 1155:
Subjective
Follow-up For:
CHF
Subjective:
Saw pt at bedside, he was tired and not in the mood to converse with me. He was 
on 1 L of O2. No acute overnight events or complaints. Feels much better.
 
Review of Systems
Constitutional:
Reports: no symptoms. 
 
Objective
Last 24 Hrs of Vital Signs/I&O
 Vital Signs
 
 
Date Time Temp Pulse Resp B/P B/P Pulse O2 O2 Flow FiO2
 
     Mean Ox Delivery Rate 
 
 1410 98.0 70 18 150/80  95 Room Air  
 
 1107      95 Room Air  
 
 1100  68  140/70     
 
 0856      98 Nasal 2.0L 
 
       Cannula  
 
 0841  70  156/82     
 
 0737 98.2 60 20 150/56  98 Nasal 2.0L 
 
       Cannula  
 
 0723 97.5 95 20 112/60  98 Room Air  
 
 2230 97.5 90 20 162/78  96 Room Air  
 
 2202       Nasal 4.0L 
 
       Cannula  
 
 2122  90  162/78     
 
 
 Intake & Output
 
 
  1600  0800  0000
 
Intake Total 580 0 120
 
Output Total   
 
Balance 580 0 120
 
    
 
Intake, IV 20 0 
 
Intake, Oral 560 0 120
 
Number  0 
 
Bowel   
 
Movements   
 
Patient   81.788 kg
 
Weight   
 
Weight   Reported by Patient
 
Measurement   
 
Method   
 
 
 
 
Physical Exam
General Appearance: Alert, Oriented X3, Cooperative, No Acute Distress
Skin: No Significant Lesion
HEENT: Atraumatic, PERRLA, EOMI
Neck: Supple
Cardiovascular: Regular Rate, Normal S1, Normal S2
Lungs: NO CRACKLES. No wheezes. some diminished air movement
Abdomen: Soft, No Tenderness
Neurological: Normal Speech, Cranial Nerves 3-12 NL
Extremities: 3+ edema bilat le
Current Medications:
 Current Medications
 
 
  Sig/George Start time  Last
 
Medication Dose Route Stop Time Status Admin
 
Albuterol Sulfate 3 ML TID  0900 AC 
 
  INH   1313
 
Amlodipine Besylate 10 MG DAILY  1454 AC 
 
  PO   841
 
Benzonatate 100 MG TID  1500 DC 
 
  PO   212
 
Carvedilol 12.5 MG BID  2100 AC 
 
  PO   41
 
Duloxetine HCl 60 MG DAILY  1454 AC 
 
  PO   08
 
Famotidine 40 MG DAILY  1454 AC 
 
  PO   0841
 
Furosemide 30 MG ONCE ONE  1630 DC 
 
  IV  1631  
 
Guaifenesin/ 10 ML Q4P PRN  2145 AC 
 
Dextromethorphan  PO   
 
Heparin Sodium  5,000 UNIT Q8H  1457 AC 
 
(Porcine)  SC   1421
 
Insulin Aspart 0 AT BEDTIME  2100 AC 
 
  SC   2122
 
Insulin Aspart 0 TIDAC  1700 AC 
 
  SC   0834
 
Ipratropium Bromide 2.5 ML Q6P PRN  0915 AC 
 
  INH   
 
Losartan Potassium 100 MG DAILY  1455 AC 
 
  PO   0841
 
Patient Medication  1 ED ONE ONE  1700 AC 
 
Teaching  ED  1701  
 
 
 
 
Last 24 Hrs of Lab/Randy Results
Last 24 Hrs of Labs/Mics:
 Laboratory Tests
 
18 0652:
Anion Gap 15, Estimated GFR 43  L, BUN/Creatinine Ratio 23.8, CBC w Diff NO MAN 
DIFF REQ, RBC 4.34  L, MCV 83.5, MCH 26.9  L, MCHC 32.2  L, RDW 14.4, MPV 10.6  
H, Gran % 84.9  H, Lymphocytes % 9.6  L, Monocytes % 5.2, Eosinophils % 0.1, 
Basophils % 0.2, Absolute Granulocytes 10.1  H, Absolute Lymphocytes 1.1  L, 
Absolute Monocytes 0.6, Absolute Eosinophils 0, Absolute Basophils 0
 
18 0107:
Troponin I 0.09
 
18 2045:
Troponin I 0.07
 Microbiology
 1140  URINE ROUT: Legionella Antigen - COMP
 1140  URINE ROUT: Streptococcus pneumoniae Antigen (M - COMP
 
 
 
Assessment/Plan
Assessment:
ASSESSMENT:This is a 67-year-old gentleman with past medical history of 
hypertension, CHF, ?CKD, diabetes who presented to the emergency department w/ 
CC dyspnea and dyspnea on exertion.  He also endorses a chronic cough which was 
refractory to outpt antibiotics, nebulizer and tessalon perles. Pt is admitted 
for further workup of his dyspnea;  COPD vs CHF.
 
PLAN:
 
Acute hypoxic respiratory failure: XRY shows cardiogenic pulmonary edema 
suggestive of Heart failure. BNP 7500. No previous value available.
* IV lasix 40 mg BID
* I/O
* Hold off abx
* Monitor cx
* appreciate cardio consult
* TRC with ipratropium and albuterol
* guaifenasin
 
Hypertensive urgency:
* Con't Amlodipine, Losartan, Carvedilol.
* If renal function decreases then will consider holding amlodipine.
 
Chest pain:
* serial trops negative
 
CKD 2: Today Cr 1.6. In Mar 2018 he was between 1.3-1.4 
 
DM
* RISS
* FS
* CC diet
 
Depression
* Con't Cymbalta
 
Prophylaxis with heparin subcutaneous.
Patient is a full code.
 
Problem List:
 1. Hypertensive urgency
 
 2. COPD exacerbation
 
 3. CHF (congestive heart failure)
 
Pain Ratin
Pain Location:
NONE
Pain Goal: Remain pain free
Pain Plan:
[facility]
BEP
Tomorrow's Labs & Rationales:
[facility]
BEP
 
 
Aristides Burk 18 1605:
Attending MD Review Statement
 
Attending Statement
Attending MD Statement: examined this patient, discuss w/resident/PA/NP, agreed 
w/resident/PA/NP, discussed with family, reviewed EMR data (avail), discussed 
with nursing, discussed with case mgmt, reviewed images, amended to note
Attending Assessment/Plan:
Patient admitted with acute on chronic CHF exaecrbation received lasix in ER 
with some improvement. Serial cardiac enzymes negative. Cardiology consulted and
recommend to continue diuresis. His creatinnie is slightly worsening today so 
will reduce dose of lasix and continue monitor Input/output, daily weights, 
creatinine function. 
 
COPD appears stable not in exacerbation.
 
gi/dvt prophyalxis
 
full code.

## 2018-04-24 NOTE — CONS- CARDIOLOGY
General Information and HPI
 
Consulting Request
Date of Consult: 04/24/18
Requested By:
Julia SAEZ,Mya
 
Reason for Consult:
CHF
Source of Information: patient
Exam Limitations: no limitations
History of Present Illness:
The patient is a 67-year-old male with a history of hypertension, diabetes, 
renal insufficiency who now presents with worsening shortness of breath and 
cough.  Patient was evaluated here in the emergency room 03/27/2018 for 
shortness of breath and was prescribed Augmentin, prednisone and a cough 
medication.  He presented 2 days later again with recurrent cough and was 
prescribed Tessalon Perles.
 
He states that over the past few days he's noted worsening shortness of breath. 
He was experiencing dyspnea upon minimal exertion and even with talking.  He 
took multiple nebulizer treatments on the day of admission without relief.  He 
was scheduled to see his pulmonologist that day but due to worsening symptoms he
presented to the emergency room for evaluation.  He also describes mild chest 
discomfort that was worse with coughing.  He denied fever or chills.
 
In the ER he is found to be in congestive heart failure with hypertensive 
urgency and was diuresed now with improvement.  He still describes a cough and 
mild shortness of breath.
 
He is a somewhat limited historian but states that in approximately 2003 he had 
an episode of chest discomfort while shoveling snow.  He was taken to [facility] and underwent a cardiac catheterization but reportedly no 
intervention was performed at that time.  He normally sees his cardiologist Dr. Salinas on [facility] in [place] but has not seen her in approximately 2 
years.  He states at that time he had a stress test and an echocardiogram but 
does not recall being told of congestive heart failure.  He states he has been 
compliant with his medications.
 
Allergies/Medications
Allergies:
Coded Allergies:
acetaminophen (PER PT MD SAID NO APAP HX OF HEP C 02/16/18)
 
Home Med List:
Amlodipine Besylate 10 MG TABLET   1 TAB PO DAILY BP  (Reported)
Carvedilol 12.5 MG TABLET   1 TAB PO BID HEART/BP  (Reported)
Duloxetine HCl 60 MG CAPSULE.   1 CAP PO DAILY MENTAL HEALTH  (Reported)
Famotidine 40 MG TABLET   1 TAB PO DAILY GI  (Reported)
Insulin Aspart (Novolog) (Unknown Strength) VIAL   (Unknown Dose) SC SEE SLIDING
SCALE DIABETES  (Reported)
Insulin Glargine,Hum.rec.anlog (Lantus Solostar) 100 UNIT/ML (3 ML) INSULN.PEN  
25 UNIT SC QHS DM  (Reported)
Losartan Potassium 100 MG TABLET   1 TAB PO DAILY BP  (Reported)
Prednisone 10 MG TABLET   3 TAB PO DAILY cough
 
Current Medications:
 Current Medications
 
 
  Sig/George Start time  Last
 
Medication Dose Route Stop Time Status Admin
 
Albuterol Sulfate 3 ML TID 04/24 0900 AC 04/24
 
  INH   0806
 
Albuterol Sulfate 3 ML ONCE ONE 04/23 1000 DC 04/23
 
  INH 04/23 1001  1001
 
Amlodipine Besylate 10 MG DAILY 04/23 1454 AC 04/24
 
  PO   0841
 
Amlodipine Besylate 10 MG ONCE ONE 04/23 1445 DC 04/23
 
  PO 04/23 1446  1457
 
Azithromycin 500 MG ONCE ONE 04/23 1415 DC 04/23
 
Sodium Chloride 250 ML IV 04/23 1514  1415
 
Benzonatate 100 MG TID 04/23 1500 AC 04/23
 
  PO   2122
 
Carvedilol 12.5 MG BID 04/23 2100 AC 04/24
 
  PO   0841
 
Carvedilol 12.5 MG ONCE ONE 04/23 1445 DC 
 
  PO 04/23 1446  
 
Ceftriaxone Sodium 0 .STK-MED ONE 04/23 1416 DC 
 
  .ROUTE   
 
Ceftriaxone Sodium 1,000 MG ONCE ONE 04/23 1415 DC 04/23
 
  IV 04/23 1416  1415
 
Duloxetine HCl 60 MG DAILY 04/23 1454 AC 04/24
 
  PO   0841
 
Famotidine 40 MG DAILY 04/23 1454 AC 04/24
 
  PO   0841
 
Furosemide 0 .STK-MED ONE 04/23 1436 DC 
 
  IV   
 
Furosemide 60 MG ONCE ONE 04/23 1430 DC 04/23
 
  IV 04/23 1431  1437
 
Guaifenesin/Codeine  10 ML ONCE ONE 04/23 1015 DC 04/23
 
Phosphate  PO 04/23 1016  1118
 
Guaifenesin/ 10 ML Q4P PRN 04/23 2145 AC 
 
Dextromethorphan  PO   
 
Heparin Sodium  0 .STK-MED ONE 04/23 1610 DC 
 
(Porcine)  .ROUTE   
 
Heparin Sodium  5,000 UNIT Q8H 04/23 1457 AC 04/24
 
(Porcine)  SC   0635
 
Insulin Aspart 0 AT BEDTIME 04/23 2100 AC 04/23
 
  SC   2122
 
Insulin Aspart 0 TIDAC 04/23 1700 AC 04/24
 
  SC   0834
 
Ipratropium Bromide 2.5 ML Q6P PRN 04/24 0915 AC 
 
  INH   
 
Ipratropium Bromide 2.5 ML ONCE ONE 04/23 1000 DC 04/23
 
  INH 04/23 1001  1000
 
Losartan Potassium 100 MG DAILY 04/23 1455 AC 04/24
 
  PO   0841
 
Losartan Potassium 100 MG ONCE ONE 04/23 1445 DC 04/23
 
  PO 04/23 1446  1457
 
Methylprednisolone 40 MG Q8H 04/23 1457 DC 
 
  IV   
 
Methylprednisolone 125 MG ONCE ONE 04/23 1015 DC 04/23
 
  IV 04/23 1016  1118
 
Methylprednisolone 0 .STK-MED ONE 04/23 1007 DC 
 
  .ROUTE   
 
 
 
 
Review of Systems
Review of Systems:
Eyes no blurred or double vision
Ears no deafness or ringing
Nose and throat no recurrent sinusitis
Lungs per history of present illness
Heart per history of present illness
Abdomen no nausea vomiting
Musculoskeletal occasional muscle and joint pains
Psych no anxiety or depression
Neuro without recurrent headache or seizures
Endocrine no heat or cold intolerance
 
Past History
 
Travel History
Traveled to [place] past 21 day No
 
Medical History
Blood Transfusion Hx: Yes
Neurological: NONE
EENT: NONE
Cardiovascular: CHF, hypertension
Respiratory: asthma, COPD, pneumonia
Gastrointestinal: NONE
Hepatic: NONE
Renal: NONE
Musculoskeletal: NONE
Psychiatric: NONE
Endocrine: DM
Blood Disorders: NONE
 
Surgical History
Surgical History: non-contributory
 
Psychosocial History
Where Do You Live? Home
Who Do You Live With? parent
Primary Language: English
Smoking Status: Former Smoker (Approximately 30 years ago)
ETOH Use: denies use
Illicit Drug Use: cocaine, Stopped Using 30 years ago 
Living Will? no
 
Functional Ability
ADLs
Independent: dressing, eating, toileting, bathing. 
Ambulation: independent
IADLs
Independent: shopping, housework, finances, food prep, telephone, transportation
, medication admin. 
 
Exam & Diagnostic Data
Vital Signs and I&O
Vital Signs
 
 
Date Time Temp Pulse Resp B/P B/P Pulse O2 O2 Flow FiO2
 
     Mean Ox Delivery Rate 
 
04/24 0856      98 Nasal 2.0L 
 
       Cannula  
 
04/24 0841  70  156/82     
 
04/24 0737 98.2 60 20 150/56  98 Nasal 2.0L 
 
       Cannula  
 
04/24 0723 97.5 95 20 112/60  98 Room Air  
 
04/23 2230 97.5 90 20 162/78  96 Room Air  
 
04/23 2202       Nasal 4.0L 
 
       Cannula  
 
04/23 2122  90  162/78     
 
04/23 1648 98.1 95 20 178/104  95 Room Air  
 
04/23 1607  95  160/88     
 
04/23 1543  95 22 160/88  98 Room Air  
 
04/23 1458 99.1 82 34 174/101     
 
04/23 1457 99.1 82 34 174/101     
 
04/23 1457 99.1 82 34 174/101     
 
04/23 1454    174/101     
 
04/23 1246 99.1 82 34 170/87  96 Room Air  
 
04/23 1122      94   
 
04/23 1001      96   
 
04/23 0946 97.9 93 40 200/110  96 Room Air  
 
 
 Intake & Output
 
 
 04/24 1600 04/24 0800 04/24 0000 04/23 1600 04/23 0800 04/23 0000
 
Intake Total  0 120 0  
 
Output Total      
 
Balance  0 120 0  
 
       
 
Intake, IV  0    
 
Intake, Oral  0 120 0  
 
Number  0    
 
Bowel      
 
Movements      
 
Patient   180 lb   
 
Weight      
 
Weight   Reported by Patient   
 
Measurement      
 
Method      
 
 
 
Physical Exam:
Patient is a well-developed well-nourished male appearing in no acute distress
HEENT is unremarkable
Neck is supple there is no JVD
Lungs bibasilar rales
Heart regular rhythm S1 and S2 are normal no gallops or rubs 1/6 systolic 
ejection murmur at the left sternal border
Abdomen bowel sounds positive
Extremities trace edema
 
 
Labs/Randy Results:
 Laboratory Tests
 
 
 04/24 04/24 04/23
 
 0652 0107 2045
 
Chemistry   
 
  Sodium (137 - 145 mmol/L) 141  
 
  Potassium (3.5 - 5.1 mmol/L) 4.2  
 
  Chloride (98 - 107 mmol/L) 101  
 
  Carbon Dioxide (22 - 30 mmol/L) 25  
 
  Anion Gap (5 - 16) 15  
 
  BUN (9 - 20 mg/dL) 38  H  
 
  Creatinine (0.7 - 1.2 mg/dL) 1.6  H  
 
  Estimated GFR (>60 ml/min) 43  L  
 
  BUN/Creatinine Ratio (7 - 25 %) 23.8  
 
  Troponin I (<0.11 ng/ml)  0.09 0.07
 
Hematology   
 
  CBC w Diff NO MAN DIFF REQ  
 
  WBC (4.8 - 10.8 /CUMM) 11.9  H  
 
  RBC (4.70 - 6.10 /CUMM) 4.34  L  
 
  Hgb (14.0 - 18.0 G/DL) 11.7  L  
 
  Hct (42 - 52 %) 36.3  L  
 
  MCV (80.0 - 94.0 FL) 83.5  
 
  MCH (27.0 - 31.0 PG) 26.9  L  
 
  MCHC (33.0 - 37.0 G/DL) 32.2  L  
 
  RDW (11.5 - 14.5 %) 14.4  
 
  Plt Count (130 - 400 /CUMM) 240  
 
  MPV (7.4 - 10.4 FL) 10.6  H  
 
  Gran % (42.2 - 75.2 %) 84.9  H  
 
  Lymphocytes % (20.5 - 51.1 %) 9.6  L  
 
  Monocytes % (1.7 - 9.3 %) 5.2  
 
  Eosinophils % (0 - 5 %) 0.1  
 
  Basophils % (0.0 - 2.0 %) 0.2  
 
  Absolute Granulocytes (1.4 - 6.5 /CUMM) 10.1  H  
 
  Absolute Lymphocytes (1.2 - 3.4 /CUMM) 1.1  L  
 
  Absolute Monocytes (0.10 - 0.60 /CUMM) 0.6  
 
  Absolute Eosinophils (0.0 - 0.7 /CUMM) 0  
 
  Absolute Basophils (0.0 - 0.2 /CUMM) 0  
 
 
 
 
 04/23
 
 1230
 
Chemistry 
 
  Sodium (137 - 145 mmol/L) 142
 
  Potassium (3.5 - 5.1 mmol/L) 3.9
 
  Chloride (98 - 107 mmol/L) 107
 
  Carbon Dioxide (22 - 30 mmol/L) 23
 
  Anion Gap (5 - 16) 12
 
  BUN (9 - 20 mg/dL) 23  H
 
  Creatinine (0.7 - 1.2 mg/dL) 1.4  H
 
  Estimated GFR (>60 ml/min) 51  L
 
  BUN/Creatinine Ratio (7 - 25 %) 16.4
 
  Glucose (65 - 99 mg/dL) 125  H
 
  Calcium (8.4 - 10.2 mg/dL) 9.0
 
  Total Bilirubin (0.2 - 1.3 mg/dL) 1.1
 
  AST (17 - 59 U/L) 24
 
  ALT (21 - 72 U/L) 23
 
  Alkaline Phosphatase (< 127 U/L) 46
 
  Troponin I (<0.11 ng/ml) 0.10
 
  Pro-B-Natriuretic Pept (<125 pg/mL) 7500  H
 
  Total Protein (6.3 - 8.2 g/dL) 7.4
 
  Albumin (3.5 - 5.0 g/dL) 3.9
 
  Globulin (1.9 - 4.2 gm/dL) 3.5
 
  Albumin/Globulin Ratio (1.1 - 2.2 %) 1.1
 
Hematology 
 
  CBC w Diff MAN DIFF ORDERED
 
  WBC (4.8 - 10.8 /CUMM) 10.9  H
 
  RBC (4.70 - 6.10 /CUMM) 3.99  L
 
  Hgb (14.0 - 18.0 G/DL) 10.8  L
 
  Hct (42 - 52 %) 33.3  L
 
  MCV (80.0 - 94.0 FL) 83.3
 
  MCH (27.0 - 31.0 PG) 27.0
 
  MCHC (33.0 - 37.0 G/DL) 32.4  L
 
  RDW (11.5 - 14.5 %) 14.6  H
 
  Plt Count (130 - 400 /CUMM) 226
 
  MPV (7.4 - 10.4 FL) 9.9
 
  Gran % (42.2 - 75.2 %) 83.7  H
 
  Lymphocytes % (20.5 - 51.1 %) 8.9  L
 
  Monocytes % (1.7 - 9.3 %) 4.6
 
  Eosinophils % (0 - 5 %) 2.3
 
  Basophils % (0.0 - 2.0 %) 0.5
 
  Absolute Granulocytes (1.4 - 6.5 /CUMM) 9.2  H
 
  Absolute Lymphocytes (1.2 - 3.4 /CUMM) 1.0  L
 
  Absolute Monocytes (0.10 - 0.60 /CUMM) 0.5
 
  Absolute Eosinophils (0.0 - 0.7 /CUMM) 0.2
 
  Absolute Basophils (0.0 - 0.2 /CUMM) 0.1
 
  Platelet Estimate (ADEQUATE) VERIFIED BY SMEAR
 
  Anisocytosis 1+
 
 
 
 
Diagnostic Data
EKG Results
Personally reviewed demonstrates sinus rhythm left atrial abnormality left 
ventricular hypertrophy with repolarization abnormality nonspecific ST-T wave 
changes
CXR Results
IMPRESSION:
Findings of cardiogenic pulmonary edema are similar to 03/27/2018.
 
Assessment/Plan
Assessment/Plan
1.  Shortness of breath secondary to acute congestive heart failure with 
significantly elevated BNP.  There is no evidence for an acute coronary syndrome
with negative troponins.
2.  Hypertension
3.  Diabetes
4.  COPD by history
5.  Chronic renal insufficiency
 
Recommendations
1.  Continue to diuresis monitoring renal function closely
2.  Would obtain an echocardiogram to assess LV function
3.  Continue nebulizers for COPD
4.  Would obtain records from his cardiologist Dr. Salinas in [place] although 
they may not be that helpful since he has not followed up in 2 years.
Thank you for allowing [facility] to participate in the care 
of your patient.
 
 
Consult Acknowledgment
- Thank you for your consult request.

## 2018-04-25 VITALS — DIASTOLIC BLOOD PRESSURE: 72 MMHG | SYSTOLIC BLOOD PRESSURE: 110 MMHG

## 2018-04-25 VITALS — SYSTOLIC BLOOD PRESSURE: 138 MMHG | DIASTOLIC BLOOD PRESSURE: 70 MMHG

## 2018-04-25 VITALS — SYSTOLIC BLOOD PRESSURE: 134 MMHG | DIASTOLIC BLOOD PRESSURE: 82 MMHG

## 2018-04-25 VITALS — SYSTOLIC BLOOD PRESSURE: 160 MMHG | DIASTOLIC BLOOD PRESSURE: 90 MMHG

## 2018-04-25 LAB
ABSOLUTE GRANULOCYTE CT: 6.8 /CUMM (ref 1.4–6.5)
BASOPHILS # BLD: 0.1 /CUMM (ref 0–0.2)
BASOPHILS NFR BLD: 0.5 % (ref 0–2)
EOSINOPHIL # BLD: 0.4 /CUMM (ref 0–0.7)
EOSINOPHIL NFR BLD: 3.1 % (ref 0–5)
ERYTHROCYTE [DISTWIDTH] IN BLOOD BY AUTOMATED COUNT: 14.5 % (ref 11.5–14.5)
GRANULOCYTES NFR BLD: 55.7 % (ref 42.2–75.2)
HCT VFR BLD CALC: 39.5 % (ref 42–52)
LYMPHOCYTES # BLD: 4.3 /CUMM (ref 1.2–3.4)
MCH RBC QN AUTO: 27.7 PG (ref 27–31)
MCHC RBC AUTO-ENTMCNC: 32.7 G/DL (ref 33–37)
MCV RBC AUTO: 84.7 FL (ref 80–94)
MONOCYTES # BLD: 0.7 /CUMM (ref 0.1–0.6)
PLATELET # BLD: 295 /CUMM (ref 130–400)
PMV BLD AUTO: 10.4 FL (ref 7.4–10.4)
RED BLOOD CELL CT: 4.67 /CUMM (ref 4.7–6.1)
WBC # BLD AUTO: 12.3 /CUMM (ref 4.8–10.8)

## 2018-04-25 NOTE — PATIENT DISCHARGE INSTRUCTIONS
Discharge Instructions
 
General Discharge Information
You were seen/treated for:
CHF
You had these procedures:
Diuresis
Echo
Special Instructions:
1. Please follow up with your pcp in one week
2. Please follow up with your cardiologist
3. Please take your meds as prescribed
4. Please follow up with CHF clinic
 
Diet
Continue normal diet: No
Recommended Diet: Regular no added salt
 
Activity
Activity Self Limited: Yes
 
Acute Coronary Syndrome
 
Inclusion Criteria
At DC or during hospital stay patient has or had the following:
ACS DIAGNOSIS No
 
Discharge Core Measures
Meds if any: Prescribed or Continued at Discharge
Meds if any: NOT Prescribed or Continued at Discharge
 
Congestive Heart Failure
 
Inclusion Criteria
At DC or during hospital stay patient has or had the following:
CHF DIAGNOSIS No
 
Discharge Core Measures
Meds if any: Prescribed or Continued at Discharge
Meds if any: NOT Prescribed or Continued at Discharge
 
Cerebrovascular accident
 
Inclusion Criteria
At DC or during hospital stay patient has or had the following:
CVA/TIA Diagnosis No
 
Discharge Core Measures
Meds if any: Prescribed or Continued at Discharge
Meds if any: NOT Prescribed or Continued at Discharge
 
Venous thromboembolism
 
Inclusion Criteria
VTE Diagnosis No
VTE Type NONE
VTE Confirmed by (Test) NONE
 
Discharge Core Measures
- Per Current guidelines, there needs to be overlap
- treatment for the first 5 days of Warfarin therapy.
- If discharged on Warfarin prior to 5 days of
- overlap therapy, the patient will need to be
- assessed for post discharge needs including
- *Post discharge parental anticoagulation
- *Warfarin and/or parental anticoagulation education
- *Follow up date to check INR post discharge
At least 5 days overlap therapy as Inpatient No
Meds if any: Prescribed or Continued at Discharge
Note: Overlap Therapy is Warfarin and Anticoagulant
Meds if any: NOT Prescribed or Continued at Discharge

## 2018-04-25 NOTE — RADIOLOGY REPORT
EXAMINATION:
XR PORTABLE CHEST
 
CLINICAL INFORMATION:
67-year-old male with pulmonary edema and possible pneumonia. For follow-up.
 
COMPARISON:
04/23/2018.
 
TECHNIQUE:
Portable frontal view of the chest was obtained.
 
FINDINGS:
Complete resolution of previously documented linear interstitial lung
markings predominantly at both mid to lower lung fields, consistent with
resolution of interstitial edema. Currently both lung fields are
symmetrically expanded and are clear. The cardiomediastinal silhouette is
within normal limit. There is no pleural effusion present.
 
The visualized upper abdomen is unremarkable.
 
IMPRESSION:
Complete interval resolution of interstitial lung changes since 04/23/2018,
consistent with resolved interstitial pulmonary edema. No radiographic
evidence of pneumonia.

## 2018-04-25 NOTE — PN- CARDIOLOGY
Subjective
Subjective:
 
Patient feels he is improving.
 
Objective
Vital Signs and I&Os
Vital Signs
 
 
Date Time Temp Pulse Resp B/P B/P Pulse O2 O2 Flow FiO2
 
     Mean Ox Delivery Rate 
 
04/25 0930      98 Room Air  
 
04/25 0928 98.0 68 20 138/70  98 Room Air  
 
04/25 0649 97.9 52 20 134/82  98 Room Air  
 
04/25 0000       Room Air  
 
04/24 2210 98.7 68 18 140/70  95 Room Air  
 
04/24 2200      97 Room Air  
 
04/24 2053  67  140/70     
 
04/24 2016      98 Room Air  
 
04/24 1600       Room Air  
 
04/24 1410 98.0 70 18 150/80  95 Room Air  
 
 
 Intake & Output
 
 
 04/25 1600 04/25 0800 04/25 0000 04/24 1600 04/24 0800 04/24 0000
 
Intake Total  380 400 580 0 120
 
Output Total  825 1050   
 
Balance  -445 -650 580 0 120
 
       
 
Intake, IV    20 0 
 
Intake, Oral  380 400 560 0 120
 
Number   1  0 
 
Bowel      
 
Movements      
 
Output, Urine  825 1050   
 
Patient   146 lb   180 lb
 
Weight      
 
Weight      Reported by Patient
 
Measurement      
 
Method      
 
 
 
Physical Exam:
General: no apparent distress. Alert.
Eyes: No obvious scleral icterus.
HEENT: No jugular venous distention or abnormal jugular venous pulsations.
Cardiovascular: Normal intensity S1/S2.  PMI not grossly displaced.
Respiratory: Lungs clear to auscultation bilaterally.
Abdomen: Soft, nontender with no guarding or rebound tenderness.
Musculoskeletal: No clubbing or cyanosis noted; no edema
Skin: warm
Neurologic: No gross focal deficits noted.
Lymph: No gross lymphadenopathy. 
 
Current Medications:
 Current Medications
 
 
  Sig/George Start time  Last
 
Medication Dose Route Stop Time Status Admin
 
Albuterol Sulfate 3 ML TID 04/24 0900 AC 04/25
 
  INH   0929
 
Amlodipine Besylate 10 MG DAILY 04/23 1454 AC 04/25
 
  PO   0922
 
Carvedilol 12.5 MG BID 04/23 2100 AC 04/25
 
  PO   0922
 
Duloxetine HCl 60 MG DAILY 04/23 1454 AC 04/25
 
  PO   0922
 
Famotidine 40 MG DAILY 04/23 1454 AC 04/25
 
  PO   0922
 
Furosemide 40 MG ONE ONE 04/25 1000 DC 04/25
 
  PO 04/25 1001  0927
 
Furosemide 30 MG ONCE ONE 04/24 1630 DC 04/24
 
  IV 04/24 1631  1658
 
Guaifenesin/ 10 ML Q4P PRN 04/23 2145 AC 
 
Dextromethorphan  PO   
 
Heparin Sodium  5,000 UNIT Q8H 04/23 1457 AC 04/25
 
(Porcine)  SC   0559
 
Insulin Aspart 0 AT BEDTIME 04/23 2100 AC 04/23
 
  SC   2122
 
Insulin Aspart 0 TIDAC 04/23 1700  04/24
 
  SC   1658
 
Ipratropium Bromide 2.5 ML Q6P PRN 04/24 0915 AC 
 
  INH   
 
Losartan Potassium 100 MG DAILY 04/23 1455  04/25
 
  PO   0922
 
Patient Medication  1 ED ONE ONE 04/24 1700 AK 04/25
 
Teaching  ED 04/24 1701  0653
 
 
 
 
Results
Last 48 Hrs of Labs/Mics:
 Laboratory Tests
 
04/25/18 0915:
CBC w Diff NO MAN DIFF REQ, RBC 4.67  L, MCV 84.7, MCH 27.7, MCHC 32.7  L, RDW 
14.5, MPV 10.4, Gran % 55.7, Lymphocytes % 34.8, Monocytes % 5.9, Eosinophils % 
3.1, Basophils % 0.5, Absolute Granulocytes 6.8  H, Absolute Lymphocytes 4.3  H,
Absolute Monocytes 0.7  H, Absolute Eosinophils 0.4, Absolute Basophils 0.1
 
04/25/18 0718:
Anion Gap 16, Estimated GFR 43  L, BUN/Creatinine Ratio 28.1  H
 
04/24/18 0652:
Anion Gap 15, Estimated GFR 43  L, BUN/Creatinine Ratio 23.8, CBC w Diff NO MAN 
DIFF REQ, RBC 4.34  L, MCV 83.5, MCH 26.9  L, MCHC 32.2  L, RDW 14.4, MPV 10.6  
H, Gran % 84.9  H, Lymphocytes % 9.6  L, Monocytes % 5.2, Eosinophils % 0.1, 
Basophils % 0.2, Absolute Granulocytes 10.1  H, Absolute Lymphocytes 1.1  L, 
Absolute Monocytes 0.6, Absolute Eosinophils 0, Absolute Basophils 0
 
04/24/18 0107:
Troponin I 0.09
 
04/23/18 2045:
Troponin I 0.07
 
04/23/18 1230:
Anion Gap 12, Estimated GFR 51  L, BUN/Creatinine Ratio 16.4, Glucose 125  H, 
Calcium 9.0, Total Bilirubin 1.1, AST 24, ALT 23, Alkaline Phosphatase 46, 
Troponin I 0.10, Pro-B-Natriuretic Pept 7500  H, Total Protein 7.4, Albumin 3.9,
Globulin 3.5, Albumin/Globulin Ratio 1.1, CBC w Diff MAN DIFF ORDERED, RBC 3.99 
L, MCV 83.3, MCH 27.0, MCHC 32.4  L, RDW 14.6  H, MPV 9.9, Gran % 83.7  H, 
Lymphocytes % 8.9  L, Monocytes % 4.6, Eosinophils % 2.3, Basophils % 0.5, 
Absolute Granulocytes 9.2  H, Absolute Lymphocytes 1.0  L, Absolute Monocytes 
0.5, Absolute Eosinophils 0.2, Absolute Basophils 0.1, Platelet Estimate 
VERIFIED BY SMEAR, Anisocytosis 1+
 Microbiology
04/24 1140  URINE ROUT: Legionella Antigen - COMP
04/24 1140  URINE ROUT: Streptococcus pneumoniae Antigen (M - COMP
 
 
Recent Imaging Studies:
 
Telemetry tracings were personally reviewed and shows sinus rhythm and sinus 
bradycardia with no prolonged pauses
 
cxr:
Complete interval resolution of interstitial lung changes since 04/23/2018,
consistent with resolved interstitial pulmonary edema. No radiographic
evidence of pneumonia.
 
Assessment/Plan
Assessment/Plan
 
1.  Shortness of breath secondary to acute congestive heart failure with 
significantly elevated BNP.  There is no evidence for an acute coronary syndrome
with negative troponins.
2.  Hypertension
3.  Diabetes
4.  COPD by history
5.  Chronic renal insufficiency
 
Patient feels he is improving.  Agree with continuing on Lasix; recommend 40 mg 
p.o. daily.  Blood pressure appears reasonably well controlled. Echocardiogram 
and prior cardiology records are pending. It is unclear why he is not on 
outpatient aspirin and statin therapy in the setting of CAD risk equivalent in 
the form of diabetes.
 
 
Rell Farris MD [facility]
Continue telemetry? No

## 2018-04-25 NOTE — PN- HOUSESTAFF
**See Addendum**
Kevin SAEZ,Briana 18 0751:
Subjective
Follow-up For:
CHF
Subjective:
Saw pt at bedside today. He was sleepy but easy to rouse. No overnight events to
complaints.
 
Review of Systems
Constitutional:
Denies: chills, malaise, weakness. 
EENTM:
Reports: no symptoms. 
Cardiovascular:
Denies: chest pain, palpitations. 
Respiratory:
Reports: cough.  Denies: short of breath. 
Gastrointestinal:
Reports: no symptoms.  Denies: abdominal pain. 
Genitourinary:
Reports: no symptoms. 
Musculoskeletal:
Reports: no symptoms. 
 
Objective
Last 24 Hrs of Vital Signs/I&O
 Vital Signs
 
 
Date Time Temp Pulse Resp B/P B/P Pulse O2 O2 Flow FiO2
 
     Mean Ox Delivery Rate 
 
 0930      98 Room Air  
 
 0928 98.0 68 20 138/70  98 Room Air  
 
 0649 97.9 52 20 134/82  98 Room Air  
 
 0000       Room Air  
 
 2210 98.7 68 18 140/70  95 Room Air  
 
 2200      97 Room Air  
 
 2053  67  140/70     
 
2016      98 Room Air  
 
 1600       Room Air  
 
 1410 98.0 70 18 150/80  95 Room Air  
 
 
 Intake & Output
 
 
  1600  0800  0000
 
Intake Total 420 380 400
 
Output Total 
 
Balance 170 -445 -650
 
    
 
Intake, IV 20  
 
Intake, Oral 400 380 400
 
Number 1  1
 
Bowel   
 
Movements   
 
Output, Urine 
 
Patient   66.224 kg
 
Weight   
 
 
 
 
Physical Exam
General Appearance: Alert, Oriented X3, Cooperative, No Acute Distress
HEENT: Atraumatic, PERRLA, EOMI
Neck: Supple
Cardiovascular: Regular Rate, Normal S1, Normal S2, No Murmurs
Lungs: air movement much improved from yesterday. no wheezes or crackles
Abdomen: Soft, No Tenderness
 
Assessment/Plan
Assessment:
ASSESSMENT:This is a 67-year-old gentleman with past medical history of 
hypertension, CHF, ?CKD, diabetes who presented to the emergency department w/ 
CC dyspnea and dyspnea on exertion.  He also endorses a chronic cough which was 
refractory to outpt antibiotics, nebulizer and tessalon perles. Pt is admitted 
for further workup of his dyspnea which is most likely secndary to CHF given his
xry with pulm edema and elevated BNP.
 
PLAN:
 
Acute hypoxic respiratory failure: XRY shows cardiogenic pulmonary edema 
suggestive of Heart failure. BNP 7500. No previous value available.
* Switched to PO lasix
* I/O
* Hold off abx
* Monitor cx--> much improved with resolution of edema
* appreciate cardio consult
* TRC with ipratropium and albuterol
* guaifenasin
 
Hypertensive urgency:
* Con't Amlodipine, Losartan, Carvedilol.
 
Chest pain:
* serial trops negative
 
CKD 2: Today Cr 1.6. In Mar 2018 he was between 1.3-1.4 
 
DM
* RISS
* FS
* CC diet
 
Depression
* Con't Cymbalta
 
Prophylaxis with heparin subcutaneous.
Patient is a full code.
Problem List:
 1. CHF (congestive heart failure)
 
Pain Ratin
Pain Location:
none
Pain Goal: Remain pain free
Pain Plan:
none
Tomorrow's Labs & Rationales:
Aristides Blanco 18 1116:
Attending MD Review Statement
 
Attending Statement
Attending MD Statement: examined this patient, discuss w/resident/PA/NP, agreed 
w/resident/PA/NP, discussed with family, reviewed EMR data (avail), discussed 
with nursing, discussed with case mgmt, reviewed images, amended to note
Attending Assessment/Plan:
Patient admitted with acute on chronic CHF exacerbation received lasix in ER 
with improvement. Serial cardiac enzymes negative. Cardiology consulted and 
recommend to continue diuresis. ECHO with preserved EF. 
 
His creatinine is stabilsing with reduced dose of lasix and feels overall better
and wants to go home. 
 
COPD appears stable not in exacerbation.
 
anticipate dc planning.

## 2018-04-26 VITALS — DIASTOLIC BLOOD PRESSURE: 92 MMHG | SYSTOLIC BLOOD PRESSURE: 146 MMHG

## 2018-04-26 VITALS — SYSTOLIC BLOOD PRESSURE: 142 MMHG | DIASTOLIC BLOOD PRESSURE: 88 MMHG

## 2018-04-26 VITALS — SYSTOLIC BLOOD PRESSURE: 122 MMHG | DIASTOLIC BLOOD PRESSURE: 78 MMHG

## 2018-04-26 NOTE — ECHOCARDIOGRAM REPORT
EDOUARD KRISTEN 
 
 Age:    67     :    1950      Gender:     M 
 
 MRN:    [id number] 
 
 Exam Date:     2018  
                17:00 
 
 Exam Location: 1  
 [place] 
 
 Ht (in):     69      Wt (lb):      180     BSA:    2.01 
 
 BP:          138     /     70 
 
 Ordering Physician:        Sania Brown MD 
 
 Referring Physician:       Joshua Franz MD 
 
 Technologist:              Janny Key [facility] 
 
 Room Number:               179-01 
 
 Indications:       HEART FAILURE 
 
 Rhythm: 
 
 Technical Quality: 
 
 FINDINGS 
 
 Left Ventricle 
 Left ventricular cavity size normal. Left ventricular wall thickness  
 mildly increased. There is mild global hypokinesis with moderate  
 anteroseptal hypokinesis. Left ventricular ejection fraction is  
 estimated at 35 %. Abnormal relaxation filling pattern of the left  
 ventricle (stage 1 diastolic dysfunction). 
 
 Right Ventricle 
 Normal right ventricular size and function. 
 
 Right Atrium 
 Normal right atrial size. 
 
 Left Atrium 
 Left atrial size at the upper limits of normal. 
 
 Mitral Valve 
 Structurally normal mitral valve. Mild mitral regurgitation. 
 
 Aortic Valve 
 No aortic stenosis. Trileaflet aortic valve. Mild aortic  
 regurgitation. 
 
 Tricuspid Valve 
 Structurally normal tricuspid valve. Trace tricuspid regurgitation.  
 Unable to estimate the right ventricular systolic pressure. 
 
 Pulmonic Valve 
 Pulmonic valve not well visualized, grossly normal. 
 
 Pericardium 
 No pericardial effusion. 
 
 Great Vessels 
 Normal size aortic root. Mildly dilated proximal ascending aorta  
 (4.3 cm). 
 
 CONCLUSIONS 
 Left ventricular cavity size normal. Left ventricular wall thickness  
 mildly increased. There is mild global hypokinesis with moderate  
 anteroseptal hypokinesis. Left ventricular ejection fraction is  
 estimated at 35 %.  
 Abnormal relaxation filling pattern of the left ventricle (stage 1  
 diastolic dysfunction).  
 Normal right ventricular size and function.  
 Left atrial size at the upper limits of normal.  
 Unable to estimate the right ventricular systolic pressure.  
 Mildly dilated proximal ascending aorta (4.3 cm).  
 
 
 Kishan Farris M.D. 
 (Electronically Signed) 
 Final Date:      2018  
                  10:37 
 
 MEASUREMENTS  (Male / Female) Normal Values 
 
 2D ECHO 
 LV Diastolic Diameter PLAX        5.1 cm                4.2 - 5.9 / 3.9 - 5.3 
cm 
 LV Systolic Diameter PLAX         4.3 cm                2.1 - 4.0 cm 
 LV Fractional Shortening PLAX     15.7 %                25 - 46  % 
 LV Ejection Fraction 2D Teich     32.9 %                 
 IVS Diastolic Thickness           1.4 cm                 
 LVPW Diastolic Thickness          1.4 cm                 
 LV Relative Wall Thickness        0.5                    
 RV Internal Dim ED PLAX           3.0 cm                1.9 - 3.8 cm 
 LVOT Diameter                     2.1 cm                 
 Aortic Root Diameter              3.3 cm                 
 LA Systolic Diameter LX           3.7 cm                3.0 - 4.0 / 2.7 - 3.8 
cm 
 LA Volume                         37.0 cm              18 - 58 / 22 - 52 cm 
 Ascending Aorta Diameter          4.2 cm                 
 
 DOPPLER 
 AV Peak Velocity                  102.0 cm/s             
 AV Peak Gradient                  4.2 mmHg               
 AV Mean Velocity                  72.7 cm/s              
 AV Mean Gradient                  2.0 mmHg               
 AV Velocity Time Integral         18.3 cm                
 LVOT Peak Velocity                89.5 cm/s              
 LVOT Peak Gradient                3.2 mmHg               
 LVOT Mean Velocity                61.2 cm/s              
 LVOT Mean Gradient                2.0 mmHg               
 LVOT Velocity Time Integral       14.1 cm                
 LVOT Stroke Volume                48.8 cm               
 AV Area Cont Eq vti               2.7 cm                
 AV Area Cont Eq pk                3.0 cm                
 MV Peak Velocity                  91.8 cm/s              
 MV Peak Gradient                  3.4 mmHg               
 MV Mean Velocity                  41.2 cm/s              
 MV Mean Gradient                  1.0 mmHg               
 Mitral E Point Velocity           21.2 cm/s              
 Mitral A Point Velocity           73.5 cm/s              
 Mitral E to A Ratio               0.3                    
 MV PHT Velocity                   58.9 cm/s              
 MV Deceleration Boyle             159.0 cm/s            
 MV Pressure Half Time             111.1 ms               
 MV Area PHT                       2.0 cm                
 MV Deceleration Time              412.0 ms               
 PV Peak Velocity                  80.0 cm/s              
 PV Peak Gradient                  2.6 mmHg               
 PV Mean Velocity                  58.8 cm/s              
 PV Mean Gradient                  2.0 mmHg               
 PV Velocity Time Integral         17.6 cm                
 LV E' Lateral Velocity            3.9 cm/s               
 Mitral E to LV E' Lateral Ratio   5.4                    
 LV E' Septal Velocity             4.5 cm/s               
 Mitral E to LV E' Septal Ratio    4.7

## 2018-04-26 NOTE — PN- CARDIOLOGY
Subjective
Subjective:
 
Patient is feeling well today.
 
Objective
Vital Signs and I&Os
Vital Signs
 
 
Date Time Temp Pulse Resp B/P B/P Pulse O2 O2 Flow FiO2
 
     Mean Ox Delivery Rate 
 
04/26 1044  64  148/82     
 
04/26 1043  64  148/82     
 
04/26 1043  64  148/82     
 
04/26 0832      98 Room Air  
 
04/26 0624 98.6 56 20 146/92  95 Room Air  
 
04/26 0600       Room Air  
 
04/25 2233 98.4 64 18 160/90  98 Room Air  
 
04/25 2200       Room Air  
 
04/25 2120       Room Air  
 
04/25 2109  64  160/90     
 
04/25 1845      99 Room Air  
 
04/25 1400 97.7 63 20 110/72  97 Room Air  
 
 
 Intake & Output
 
 
 04/26 1600 04/26 0800 04/26 0000 04/25 1600 04/25 0800 04/25 0000
 
Intake Total  480 1320 420 380 400
 
Output Total    
 
Balance  480 1320 170 -445 -650
 
       
 
Intake, IV    20  
 
Intake, Oral  480 1320 400 380 400
 
Number  0 0 1  1
 
Bowel      
 
Movements      
 
Output, Urine    
 
Patient   178 lb   146 lb
 
Weight      
 
 
 
Physical Exam:
 
General: no apparent distress. Alert.
Eyes: No obvious scleral icterus.
HEENT: No jugular venous distention or abnormal jugular venous pulsations.
Cardiovascular: Normal intensity S1/S2.  PMI not grossly displaced.
Respiratory: Lungs clear to auscultation bilaterally.
Abdomen: Soft, nontender with no guarding or rebound tenderness.
Musculoskeletal: No clubbing or cyanosis noted; no edema
Skin: warm
Neurologic: No gross focal deficits noted.
Lymph: No gross lymphadenopathy. 
Current Medications:
 Current Medications
 
 
  Sig/George Start time  Last
 
Medication Dose Route Stop Time Status Admin
 
Albuterol Sulfate 3 ML TID 04/24 0900 AC 04/26
 
  INH   0830
 
Amlodipine Besylate 10 MG DAILY 04/23 1454 AC 04/26
 
  PO   1044
 
Aspirin 81 MG DAILY 04/25 1318 AC 04/26
 
  PO   1042
 
Atorvastatin Calcium 40 MG 1700 04/25 1700 AC 04/25
 
  PO   1732
 
Carvedilol 12.5 MG BID 04/23 2100 AC 04/26
 
  PO   1043
 
Duloxetine HCl 60 MG DAILY 04/23 1454 AC 04/26
 
  PO   1043
 
Famotidine 40 MG DAILY 04/23 1454 AC 04/26
 
  PO   1044
 
Furosemide 40 MG DAILY 04/26 0900 AC 04/26
 
  PO   1044
 
Guaifenesin/ 10 ML Q4P PRN 04/23 2145 AC 
 
Dextromethorphan  PO   
 
Heparin Sodium  5,000 UNIT Q8H 04/23 1457 AC 04/26
 
(Porcine)  SC   0623
 
Insulin Aspart 0 AT BEDTIME 04/23 2100 AC 04/25
 
  SC   2109
 
Insulin Aspart 0 TIDAC 04/23 1700 AC 04/26
 
  SC   1042
 
Ipratropium Bromide 2.5 ML Q6P PRN 04/24 0915 AC 
 
  INH   
 
Losartan Potassium 100 MG DAILY 04/23 1455 AC 04/26
 
  PO   1043
 
 
 
 
Results
Last 48 Hrs of Labs/Mics:
 Laboratory Tests
 
04/26/18 0707:
Anion Gap 14, Estimated GFR 43  L, BUN/Creatinine Ratio 26.9  H, Phosphorus 4.8 
H, Magnesium 1.7
 
04/25/18 0915:
CBC w Diff NO MAN DIFF REQ, RBC 4.67  L, MCV 84.7, MCH 27.7, MCHC 32.7  L, RDW 
14.5, MPV 10.4, Gran % 55.7, Lymphocytes % 34.8, Monocytes % 5.9, Eosinophils % 
3.1, Basophils % 0.5, Absolute Granulocytes 6.8  H, Absolute Lymphocytes 4.3  H,
Absolute Monocytes 0.7  H, Absolute Eosinophils 0.4, Absolute Basophils 0.1
 
04/25/18 0718:
Anion Gap 16, Estimated GFR 43  L, BUN/Creatinine Ratio 28.1  H
 
Recent Imaging Studies:
 
Telemetry tracings are personally reviewed and shows sinus rhythm and sinus 
bradycardia with AIVR
 
 
Echocardiogram
Left ventricular cavity size normal. Left ventricular wall thickness  
 mildly increased. There is mild global hypokinesis with moderate  
 anteroseptal hypokinesis. Left ventricular ejection fraction is  
 estimated at 35 %.  
 Abnormal relaxation filling pattern of the left ventricle (stage 1  
 diastolic dysfunction).  
 Normal right ventricular size and function.  
 Left atrial size at the upper limits of normal.  
 Unable to estimate the right ventricular systolic pressure.  
 Mildly dilated proximal ascending aorta (4.3 cm).  
 
 
 Kishan Farris M.D. 
 (Electronically Signed) 
 Final Date:      26 April 2018  
                  10:37 
 
 
Assessment/Plan
Assessment/Plan
 
 
1.  Shortness of breath secondary to acute congestive heart failure with 
moderate LV dysfunction by echo of unclear duration
2.  Hypertension
3.  Diabetes
4.  COPD by history
5.  Chronic renal insufficiency
6.  Mild aortic dilatation by echo 
 
 
Patient is feeling well but echocardiogram shows evidence of significant LV 
dysfunction; thus far we do not have records from his outpatient cardiologist 
with a prior echocardiogram. I discussed the cardiomyopathy at length with the 
patient and discussed the possibility of cardiac catheterization but at this 
time he prefers to follow-up with his outpatient cardiologist for additional 
evaluation and likely elective catheterization. I would recommend decreasing the
Lasix to 20 mg p.o. daily.  I would also recommend increasing the carvedilol to 
25 mg p.o. twice daily and monitoring on telemetry for another 24 hours. As 
creatinine is stable would continue on his ARB.
 
 
 
 
Rell Farris MD [facility]
Continue telemetry? Yes

## 2018-04-26 NOTE — PN- HOUSESTAFF
Kevin SAEZ,Briana 18 0730:
Subjective
Follow-up For:
Shortness of breath
Tele-Events Since Last Visit:
Had 8-10 beats of vtach overnight. Otherwise NSR 
Subjective:
Saw pt at bedside this AM. He states he slept well and is anxious to leave the 
hospital. He did have 8-10 beats of v-tach, last night  but he was asymptomatic 
and an EKG done right after looked to be at his baseline. No complaints at this 
time
 
Review of Systems
Constitutional:
Denies: chills, fever, weakness. 
EENTM:
Reports: no symptoms. 
Respiratory:
Denies: cough, short of breath. 
Gastrointestinal:
Reports: no symptoms. 
Genitourinary:
Reports: no symptoms.  Denies: discharge, frequency, pain. 
Musculoskeletal:
Reports: no symptoms. 
 
Objective
Last 24 Hrs of Vital Signs/I&O
 Vital Signs
 
 
Date Time Temp Pulse Resp B/P B/P Pulse O2 O2 Flow FiO2
 
     Mean Ox Delivery Rate 
 
 0832      98 Room Air  
 
 0624 98.6 56 20 146/92  95 Room Air  
 
 0600       Room Air  
 
 2233 98.4 64 18 160/90  98 Room Air  
 
 2200       Room Air  
 
 2120       Room Air  
 
 2109  64  160/90     
 
 1845      99 Room Air  
 
 1400 97.7 63 20 110/72  97 Room Air  
 
 
 Intake & Output
 
 
  1600  0800  0000
 
Intake Total  480 1320
 
Output Total   
 
Balance  480 1320
 
    
 
Intake, Oral  480 1320
 
Number  0 0
 
Bowel   
 
Movements   
 
Patient   80.938 kg
 
Weight   
 
 
 
 
Physical Exam
General Appearance: Alert, Oriented X3, Cooperative, No Acute Distress
HEENT: Atraumatic, PERRLA, EOMI
Neck: Supple, No JVD
Cardiovascular: Regular Rate, Normal S1, Normal S2, No Murmurs
Lungs: Clear to Auscultation, Normal Air Movement
Abdomen: Soft, No Tenderness
Current Medications:
 Current Medications
 
 
  Sig/George Start time  Last
 
Medication Dose Route Stop Time Status Admin
 
Albuterol Sulfate 3 ML TID  0900 AC 
 
  INH   0830
 
Amlodipine Besylate 10 MG DAILY  1454 AC 
 
  PO   0922
 
Aspirin 81 MG DAILY  1318 AC 
 
  PO   1732
 
Atorvastatin Calcium 40 MG 1700  1700 AC 
 
  PO   1732
 
Carvedilol 12.5 MG BID  2100 AC 
 
  PO   210
 
Duloxetine HCl 60 MG DAILY  1454 AC 
 
  PO   09
 
Famotidine 40 MG DAILY  1454 AC 
 
  PO   0922
 
Furosemide 40 MG DAILY  0900 AC 
 
  PO   
 
Guaifenesin/ 10 ML Q4P PRN  2145 AC 
 
Dextromethorphan  PO   
 
Heparin Sodium  5,000 UNIT Q8H  1457 AC 
 
(Porcine)  SC   06
 
Insulin Aspart 0 AT BEDTIME  2100 AC 
 
  SC   210
 
Insulin Aspart 0 TIDAC  1700 AC 
 
  SC   122
 
Ipratropium Bromide 2.5 ML Q6P PRN  0915 AC 
 
  INH   
 
Losartan Potassium 100 MG DAILY  145 AC 
 
  PO   09
 
 
 
 
Last 24 Hrs of Lab/Randy Results
Last 24 Hrs of Labs/Mics:
 Laboratory Tests
 
18 0707:
Anion Gap 14, Estimated GFR 43  L, BUN/Creatinine Ratio 26.9  H, Phosphorus 4.8 
H, Magnesium 1.7
 
 
Assessment/Plan
Assessment:
ASSESSMENT:This is a 67-year-old gentleman with past medical history of 
hypertension, CHF, ?CKD, diabetes who presented to the emergency department w/ 
CC dyspnea and dyspnea on exertion.  He also endorses a chronic cough which was 
refractory to outpt antibiotics, nebulizer and tessalon perles. Pt is admitted 
for further workup of his dyspnea which is most likely secndary to CHF given his
xry with pulm edema and elevated BNP.
 
PLAN:
 
Leukocytosis: up to 12 today. No obvious source. Pt is asymptomatic. Not on oral
steroids.
* Con't monitor
 
Acute hypoxic respiratory failure: XRY shows cardiogenic pulmonary edema 
suggestive of Heart failure. BNP 7500. No previous value available.
* Switched to PO lasix 40mg daily
* I/O
* Hold off abx
* Monitor cx--> yesterday repeat cxr showed much improvement with resolution of 
edema
* Appreciate cardio consult
* TRC with ipratropium and albuterol
* Guaifenasin
* Pending echocardiogram
 
Hypertensive urgency:
* Con't Amlodipine
* Con't Losartan
* Con't Carvedilol.
 
Chest pain:
* serial trops negative
 
CKD 2: Today Cr 1.6. In Mar 2018 he was between 1.3-1.4. Unsure of pt's baseline
as he has had only one previous visit.  
 
DM
* RISS
* FS
* CC diet
 
Depression
* Con't Cymbalta
 
Prophylaxis with heparin subcutaneous.
Patient is a full code.
Problem List:
 1. CHF (congestive heart failure)
 
Pain Ratin
Pain Location:
NONE
Pain Goal: Remain pain free
Pain Plan:
NONE
Tomorrow's Labs & Rationales:
NONE
 
 
WmAristides guerrier 18 1413:
Attending MD Review Statement
 
Attending Statement
Attending MD Statement: examined this patient, discuss w/resident/PA/NP, agreed 
w/resident/PA/NP, discussed with family, reviewed EMR data (avail), discussed 
with nursing, discussed with case mgmt, reviewed images, amended to note
Attending Assessment/Plan:
Patient admitted with respiratroy insufficiency from multifactorial origin with 
new onset CHF systolic heart failure EF 35% with mild global hypokinesia and 
moderate anteroseptal hypokinesis and underlying COPD. Patient also had CKD 
stage 2 on admission. Patient is diabetic. Patient cardiac enzymes negative for 
acute MI. Cardiology consulted and recommend cardiac cathererization. Patient 
explained in detail about cath work up rule out CAD. Patient on asa/statin/arb/
diuretics/bblocker. Anticipate dc tomorrow if tolerates carvedilol and lasix 
20mg daily.

## 2018-04-27 VITALS — DIASTOLIC BLOOD PRESSURE: 76 MMHG | SYSTOLIC BLOOD PRESSURE: 126 MMHG

## 2018-04-27 VITALS — SYSTOLIC BLOOD PRESSURE: 126 MMHG | DIASTOLIC BLOOD PRESSURE: 72 MMHG

## 2018-04-27 NOTE — PN- CARDIOLOGY
Subjective
Subjective:
 
Patient continues to feel well.  He has decided he would like to proceed with 
cardiac catheterization.
 
Objective
Vital Signs and I&Os
Vital Signs
 
 
Date Time Temp Pulse Resp B/P B/P Pulse O2 O2 Flow FiO2
 
     Mean Ox Delivery Rate 
 
04/27 0905      96 Room Air  
 
04/27 0653 98.0 58 18 126/76  97 Room Air  
 
04/27 0600       Room Air  
 
04/26 2200      99 Room Air  
 
04/26 2046      99 Room Air  
 
04/26 2046 97.7 63 18 142/88  99 Room Air  
 
04/26 2040  63  142/88     
 
04/26 1945      96 Room Air Room Air 
 
04/26 1430 98.2 62 16 122/78  95 Room Air  
 
04/26 1044  64  148/82     
 
04/26 1043  64  148/82     
 
04/26 1043  64  148/82     
 
 
 Intake & Output
 
 
 04/27 1600 04/27 0800 04/27 0000 04/26 1600 04/26 0800 04/26 0000
 
Intake Total    
 
Output Total      
 
Balance    
 
       
 
Intake, Oral    
 
Number    1 0 0
 
Bowel      
 
Movements      
 
Patient   179 lb   178 lb
 
Weight      
 
Weight   Bed scale   
 
Measurement      
 
Method      
 
 
 
Physical Exam:
General: no apparent distress. Alert.
Eyes: No obvious scleral icterus.
HEENT: No jugular venous distention or abnormal jugular venous pulsations.
Cardiovascular: Normal intensity S1/S2.  PMI not grossly displaced.
Respiratory: Lungs clear to auscultation bilaterally.
Abdomen: Soft, nontender with no guarding or rebound tenderness.
Musculoskeletal: No clubbing or cyanosis noted
Skin: warm
Neurologic: No gross focal deficits noted.
Lymph: No gross lymphadenopathy. 
 
Current Medications:
 Current Medications
 
 
  Sig/George Start time  Last
 
Medication Dose Route Stop Time Status Admin
 
Albuterol Sulfate 3 ML BID 04/26 2100 AC 04/27
 
  INH   0750
 
Albuterol Sulfate 3 ML TID 04/24 0900 DC 04/26
 
  INH   1330
 
Amlodipine Besylate 10 MG DAILY 04/23 1454 AC 04/26
 
  PO   1044
 
Aspirin 81 MG DAILY 04/25 1318 AC 04/26
 
  PO   1042
 
Atorvastatin Calcium 40 MG 1700 04/25 1700 AC 04/26
 
  PO   1702
 
Carvedilol 25 MG BID 04/26 2100 AC 04/26
 
  PO   2040
 
Carvedilol 12.5 MG BID 04/23 2100 DC 04/26
 
  PO   1043
 
Duloxetine HCl 60 MG DAILY 04/23 1454 AC 04/26
 
  PO   1043
 
Famotidine 40 MG DAILY 04/23 1454 AC 04/26
 
  PO   1044
 
Furosemide 20 MG DAILY 04/27 0900 AC 
 
  PO   
 
Furosemide 40 MG DAILY 04/26 0900 DC 04/26
 
  PO   1044
 
Guaifenesin/ 10 ML Q4P PRN 04/23 2145 AC 
 
Dextromethorphan  PO   
 
Heparin Sodium  5,000 UNIT Q8H 04/23 1457 AC 04/26
 
(Porcine)  SC   0623
 
Insulin Aspart 0 AT BEDTIME 04/23 2100 AC 04/25
 
  SC   2109
 
Insulin Aspart 0 TIDAC 04/23 1700 AC 04/26
 
  SC   1510
 
Ipratropium Bromide 2.5 ML Q6P PRN 04/24 0915 AC 
 
  INH   
 
Losartan Potassium 100 MG DAILY 04/23 1455 AC 04/26
 
  PO   1043
 
Patient Medication  1 ED ONE ONE 04/26 1530 DC 04/26
 
Teaching  ED 04/26 1531  1703
 
Tramadol HCl 50 MG ONCE ONE 04/26 2100 DC 04/26
 
  PO 04/26 2101  2105
 
 
 
 
Results
Last 48 Hrs of Labs/Mics:
 Laboratory Tests
 
04/26/18 0707:
Anion Gap 14, Estimated GFR 43  L, BUN/Creatinine Ratio 26.9  H, Phosphorus 4.8 
H, Magnesium 1.7
 
Recent Imaging Studies:
 
Telemetry tracings were personally reviewed and shows sinus rhythm and sinus 
bradycardia with no prolonged pauses
 
echo
Echocardiogram
Left ventricular cavity size normal. Left ventricular wall thickness  
 mildly increased. There is mild global hypokinesis with moderate  
 anteroseptal hypokinesis. Left ventricular ejection fraction is  
 estimated at 35 %.  
 Abnormal relaxation filling pattern of the left ventricle (stage 1  
 diastolic dysfunction).  
 Normal right ventricular size and function.  
 Left atrial size at the upper limits of normal.  
 Unable to estimate the right ventricular systolic pressure.  
 Mildly dilated proximal ascending aorta (4.3 cm).  
 
 
 Kishan Farris M.D. 
 (Electronically Signed) 
 Final Date:      26 April 2018  
                  10:37 
 
Assessment/Plan
Assessment/Plan
 
1.  Shortness of breath secondary to acute congestive heart failure with 
moderate LV dysfunction by echo of unclear duration
2.  Hypertension
3.  Diabetes
4.  COPD by history
5.  Chronic renal insufficiency
6.  Mild aortic dilatation by echo 
 
 
Patient is feeling well and is tolerating the higher dose carvedilol. He has 
decided to proceed with left heart catheterization. Creatinine is stable on the 
low-dose Lasix. Would hold his ARB this morning in anticipation of cardiac 
catheterization (nurse notified); plan to transfer to [facility] for 
the procedure today.
 
 
Rell Farris MD [facility]
Continue telemetry? Yes

## 2018-04-27 NOTE — PN- HOUSESTAFF
Kevin SAEZ,Briana 18 0738:
Subjective
Follow-up For:
chf
Tele-Events Since Last Visit:
NSR 
Subjective:
saw pt at bedside this AM. He stated that he wanted to go to cardiac  
catheterization despite refusing yesterday. All arrrangements have been made. 
 
Review of Systems
Constitutional:
Denies: chills, fever, malaise, weakness. 
EENTM:
Denies: blurred vision. 
Cardiovascular:
Reports: no symptoms. 
Respiratory:
Reports: no symptoms. 
Gastrointestinal:
Reports: no symptoms. 
Genitourinary:
Reports: no symptoms. 
 
Objective
Last 24 Hrs of Vital Signs/I&O
 Vital Signs
 
 
Date Time Temp Pulse Resp B/P B/P Pulse O2 O2 Flow FiO2
 
     Mean Ox Delivery Rate 
 
 1031  6  126/72     
 
 1029  60  126/72     
 
 0905      96 Room Air  
 
 0653 98.0 58 18 126/76  97 Room Air  
 
 0600       Room Air  
 
 2200      99 Room Air  
 
 204      99 Room Air  
 
 2046 97.7 63 18 142/88  99 Room Air  
 
 2040  63  142/88     
 
 1945      96 Room Air Room Air 
 
 
 Intake & Output
 
 
  1600  0800  0000
 
Intake Total   
 
Output Total   
 
Balance   
 
    
 
Patient   81.193 kg
 
Weight   
 
Weight   Bed scale
 
Measurement   
 
Method   
 
 
 
 
Physical Exam
General Appearance: Alert, Oriented X3, Cooperative
HEENT: Atraumatic, PERRLA, EOMI
Neck: Supple
Cardiovascular: Regular Rate, Normal S1, Normal S2, No Murmurs
Lungs: Clear to Auscultation, Normal Air Movement
Abdomen: Soft, No Tenderness
Current Medications:
 Current Medications
 
 
  Sig/George Start time  Last
 
Medication Dose Route Stop Time Status Admin
 
Albuterol Sulfate 3 ML BID  2100 DCD 
 
  INH   0750
 
Amlodipine Besylate 10 MG DAILY  1454 DCD 
 
  PO   1029
 
Aspirin 81 MG DAILY  1318 DCD 
 
  PO   1042
 
Atorvastatin Calcium 40 MG 1700  1700 DCD 
 
  PO   1702
 
Carvedilol 25 MG BID  2100 DCD 
 
  PO   1031
 
Duloxetine HCl 60 MG DAILY  1454 DCD 
 
  PO   1028
 
Famotidine 40 MG DAILY  1454 DCD 
 
  PO   1029
 
Furosemide 20 MG DAILY  0900 DCD 
 
  PO   1029
 
Guaifenesin/ 10 ML Q4P PRN  2145 DCD 
 
Dextromethorphan  PO   
 
Heparin Sodium  5,000 UNIT Q8H  1457 DCD 
 
(Porcine)  SC   0623
 
Insulin Aspart 0 AT BEDTIME  2100 DCD 
 
  SC   2109
 
Insulin Aspart 0 TIDAC  1700 DCD 
 
  SC   1510
 
Ipratropium Bromide 2.5 ML Q6P PRN  0915 DCD 
 
  INH   
 
Losartan Potassium 100 MG DAILY  1455 DC 
 
  PO   1043
 
Tramadol HCl 50 MG ONCE ONE 2100 DC 
 
  PO 
 
 
 
 
Assessment/Plan
Assessment:
ASSESSMENT:This is a 67-year-old gentleman with past medical history of 
hypertension, CHF, ?CKD, diabetes who presented to the emergency department w/ 
CC dyspnea and dyspnea on exertion.  He also endorses a chronic cough which was 
refractory to outpt antibiotics, nebulizer and tessalon perles. Pt is admitted 
for further workup of his dyspnea which is most likely secndary to CHF given his
xry with pulm edema and elevated BNP. Going for cath today.
 
PLAN:
 
Asymptomatic Leukocytosis: up to 12 yesterday. No obvious source. Pt is 
asymptomatic. Not on oral steroids.
* Con't monitor out pt.
 
Acute hypoxic respiratory failure: XRY shows cardiogenic pulmonary edema 
suggestive of Heart failure. BNP 7500. No previous value available. Repeat cxr 
showed much improvement with resolution of edema
* Appreciate cardio consult
* Switched to PO lasix 40mg daily
* I/O
* Hold off abx
* TRC with ipratropium and albuterol
* Guaifenasin
* CONCLUSIONS: Left ventricular cavity size normal. Left ventricular wall 
thickness  mildly increased. There is mild global hypokinesis with moderate  
anteroseptal hypokinesis. Left ventricular ejection fraction is estimated at 35 
%.  Abnormal relaxation filling pattern of the left ventricle (stage 1  
diastolic dysfunction).  Normal right ventricular size and function.   Left 
atrial size at the upper limits of normal.   Unable to estimate the right 
ventricular systolic pressure.   Mildly dilated proximal ascending aorta (4.3 cm
).  
* Given new dx of HFREF need to rule out CAD.
 
Hypertensive urgency:
* Con't Amlodipine
* Con't Losartan
* Con't Carvedilol.
 
Chest pain:
* serial trops negative
 
CKD 2: Today Cr 1.6. In Mar 2018 he was between 1.3-1.4. Unsure of pt's baseline
as he has had only one previous visit.  
 
DM
* RISS
* FS
* CC diet
 
Depression
* Con't Cymbalta
 
Prophylaxis with heparin subcutaneous.
Patient is a full code.
Problem List:
 1. Pneumonia
 
Pain Ratin
Pain Location:
none
Pain Goal: Remain pain free
Pain Plan:
none
Tomorrow's Labs & Rationales:
none
 
Discharge Plan
Discharge Disposition: transfer to another hosp
 
 
Aristides Burk 18 1224:
Attending MD Review Statement
 
Attending Statement
Attending MD Statement: examined this patient, discuss w/resident/PA/NP, agreed 
w/resident/PA/NP, discussed with family, reviewed EMR data (avail), discussed 
with nursing, discussed with case mgmt, reviewed images, amended to note
Attending Assessment/Plan:
Patient with no new complaints. Patient being trasnferred to higher facility for
cardiac catheterization.

## 2018-04-27 NOTE — DISCHARGE SUMMARY
**See Addendum**
Visit Information
 
Visit Dates
Admission Date:
04/23/18
 
Discharge Date:
 
04/27/18
 
Hospital Course
 
Course
Attending Physician:
Wm SAEZ,Aristides
 
Primary Care Physician:
Unknown
 
Hospital Course:
This is a 67-year-old -American gentleman with a medical history 
significant for hypertension, heart failure not otherwise specified, diabetes, 
who comes in for chief complaint of dyspnea on exertion.  He also endorses 
chronic cough with sputum that did not resolve with outpatient antibiotic and 
advised treatment.  Given elevated BNP and x-ray consistent with pulmonary edema
patient was admitted to telemetry floor for treatment of congestive heart 
failure.  Given that this is a new diagnosis of workup was initiated.  
Echocardiogram shows global hypokinesis with moderate anteroseptal hypokinesis. 
He has left ventricular ejection fraction of 35% and stage I diastolic 
dysfunction.  No valvular pathology noted.  Mildly dilated proximal ascending 
aorta.  He was diuresed and medically optimized.  He has decided to proceed with
cardiac catheterization.
 
Pt was evaluated for the following problems:
Hypertension
* Continue Amlodipine
 
Heart failure with reduced ejection fraction
* Continue furosemide by mouth 20 mg daily
* Continue Coreg by mouth 25 mg twice a day
 
Depression
* Con't Duloxetine
 
COPD
* Continue albuterol
* Continue nebulizer
 
CAD:
* Continue statin
* Continue aspirin
 
Diabetes mellitus
* Con't insulin regimen
 
Allergies:
Coded Allergies:
acetaminophen (PER PT MD SAID NO APAP HX OF HEP C 02/16/18)
 
Pertinent Lab Results:
 Intake & Output
 
 
 04/27 1600 04/27 0800 04/27 0000
 
Intake Total   
 
Output Total   
 
Balance   
 
    
 
Patient   81.193 kg
 
Weight   
 
Weight   Bed scale
 
Measurement   
 
Method   
 
 
 
 
Disposition Summary
 
Disposition
Principal Diagnosis:
HFrEF
Additional Diagnosis:
copd
Discharge Disposition: other general hospital
 
Discharge Instructions
 
General Discharge Information
Code Status: Full Code
Patient's Diet:
heart healthy
Patient's Activity:
as tolerated
Follow-Up Instructions/Appts:
see above 
 
Medications at Discharge
Discharge Medications:
Stop taking the following medications:
Carvedilol (Carvedilol) 12.5 MG TABLET ORAL TWICE DAILY Qty = 60
 
Prednisone (Prednisone) 10 MG TABLET ORAL DAILY Qty = 12
 
Continue taking these medications:
Amlodipine Besylate (Amlodipine Besylate) 10 MG TABLET
    1 Tablet ORAL DAILY
    Qty = 30
    Comments:
       LAST TAKEN: 4/27/18
       TIME: 10:30 AM
 
Duloxetine HCl (Duloxetine HCl) 60 MG CAPSULE.DR
    1 Capsule ORAL DAILY
    Qty = 30
    Comments:
       LAST TAKEN: 4/27/18
       TIME: 1030 AM
 
Famotidine (Famotidine) 40 MG TABLET
    1 Tablet ORAL DAILY
    Comments:
       LAST TAKEN: 4/27/18
       TIME: 1030 AM
 
Losartan Potassium (Losartan Potassium) 100 MG TABLET
    1 Tablet ORAL DAILY
 
Insulin Glargine,Hum.rec.anlog (Lantus Solostar) 100 UNIT/ML (3 ML) INSULN.PEN
    25 Unit Inject into fatty tissue TAKE AT BEDTIME
    Qty = 15
 
Insulin Aspart (Novolog) (Unknown Strength) VIAL
    Unknown Dose Inject into fatty tissue SEE SLIDING SCALE
 
Start taking the following new medications:
Furosemide (Lasix) 20 MG TABLET
    1 Tablet ORAL DAILY
    Qty = 30
    No Refills
 
Atorvastatin Calcium (Atorvastatin Calcium) 40 MG TABLET
    1 Tablet ORAL TAKE AT BEDTIME
    Qty = 30
    No Refills
    Comments:
       LAST TAKEN: 4/26/18
       TIME: 5 PM
 
Carvedilol (Carvedilol) 25 MG TABLET
    1 Tablet ORAL TWICE DAILY
    Qty = 30
    No Refills
    Comments:
       LAST TAKEN: 4/27/18
       TIME: 1030 AM
 
Aspirin (Aspirin*) 81 MG TAB.CHEW
    1 Tablet ORAL DAILY
    Qty = 30
    No Refills
    Comments:
       LAST TAKEN: 4/26/18
       TIME: 1030 AM
 
Tiotropium Bromide (Spiriva) 18 MCG CAP.W.DEV
    1 Capsule Inhale through mouth DAILY
    Qty = 30
    No Refills
 
 
Copies To:
Gene SAEZ,Gene; Wm SAEZ,Aristides
 
Attending MD Review Statement
Documenting Attending:
Aristides Burk MD
Other Findings:
Patient admitted with respiratroy insufficiency from multifactorial origin with 
new onset CHF systolic heart failure EF 35% with mild global hypokinesia and 
moderate anteroseptal hypokinesis and underlying COPD. Patient also had CKD 
stage 2 on admission. Patient is diabetic. Patient cardiac enzymes negative for 
acute MI. Cardiology consulted and recommend cardiac cathererization. Patient 
explained in detail about cath work up rule out CAD. Patient on asa/statin/arb/
diuretics/bblocker. Patient being transferred to higher facility for cardiac 
catheterization.

## 2022-07-31 NOTE — RADIOLOGY REPORT
EXAMINATION:
XR CHEST
 
CLINICAL INFORMATION:
Cough for 5 weeks.
 
COMPARISON:
Chest radiograph 02/16/2018.
 
TECHNIQUE:
2 views of the chest were obtained.
 
FINDINGS:
There is patchy airspace opacity in the right mid lung zone which is new
since 02/16/2018. There is bibasilar subsegmental atelectasis. There is no
pleural effusion or pneumothorax. The heart is top normal in size. There are
mild degenerative changes in the thoracic spine.
 
IMPRESSION:
Developing airspace opacity in the right midlung zone compatible with
pneumonia and new since 02/16/2018.
Private Physician Medardo, [facility] calls  72y male pmh HTN DM, Arthritis pt was seen [facility] few days ago, DC yesterday after admitted for SDH. PT came to ed because "my head wasn't feeling good" with headache. No nvdc, fever and chills, cp, abd pain. PE WDWN male awake alert normocephalic atraumatic Neck supple chest clear anterior & posterior cv no rubs, gallops or murmurs abd soft +bs no mass guarding neruo gcs 15 speech fluent power 5/5 shira extr.  Leandro Cabral MD, [facility]